# Patient Record
Sex: FEMALE | Race: WHITE | NOT HISPANIC OR LATINO | URBAN - METROPOLITAN AREA
[De-identification: names, ages, dates, MRNs, and addresses within clinical notes are randomized per-mention and may not be internally consistent; named-entity substitution may affect disease eponyms.]

---

## 2017-01-23 ENCOUNTER — OUTPATIENT (OUTPATIENT)
Dept: OUTPATIENT SERVICES | Facility: HOSPITAL | Age: 62
LOS: 1 days | Discharge: HOME | End: 2017-01-23

## 2017-06-27 DIAGNOSIS — I34.9 NONRHEUMATIC MITRAL VALVE DISORDER, UNSPECIFIED: ICD-10-CM

## 2024-04-10 ENCOUNTER — INPATIENT (INPATIENT)
Facility: HOSPITAL | Age: 69
LOS: 5 days | Discharge: HOME HEALTH | DRG: 291 | End: 2024-04-16
Attending: STUDENT IN AN ORGANIZED HEALTH CARE EDUCATION/TRAINING PROGRAM | Admitting: STUDENT IN AN ORGANIZED HEALTH CARE EDUCATION/TRAINING PROGRAM
Payer: MEDICARE

## 2024-04-10 VITALS
RESPIRATION RATE: 20 BRPM | OXYGEN SATURATION: 97 % | SYSTOLIC BLOOD PRESSURE: 149 MMHG | DIASTOLIC BLOOD PRESSURE: 97 MMHG | HEART RATE: 173 BPM

## 2024-04-10 DIAGNOSIS — I48.92 UNSPECIFIED ATRIAL FLUTTER: ICD-10-CM

## 2024-04-10 LAB
ALBUMIN SERPL ELPH-MCNC: 4.5 G/DL — SIGNIFICANT CHANGE UP (ref 3.5–5.2)
ALP SERPL-CCNC: 115 U/L — SIGNIFICANT CHANGE UP (ref 30–115)
ALT FLD-CCNC: 36 U/L — SIGNIFICANT CHANGE UP (ref 0–41)
ANION GAP SERPL CALC-SCNC: 13 MMOL/L — SIGNIFICANT CHANGE UP (ref 7–14)
AST SERPL-CCNC: 33 U/L — SIGNIFICANT CHANGE UP (ref 0–41)
BASOPHILS # BLD AUTO: 0.03 K/UL — SIGNIFICANT CHANGE UP (ref 0–0.2)
BASOPHILS NFR BLD AUTO: 0.3 % — SIGNIFICANT CHANGE UP (ref 0–1)
BILIRUB SERPL-MCNC: 1 MG/DL — SIGNIFICANT CHANGE UP (ref 0.2–1.2)
BUN SERPL-MCNC: 15 MG/DL — SIGNIFICANT CHANGE UP (ref 10–20)
CALCIUM SERPL-MCNC: 9.7 MG/DL — SIGNIFICANT CHANGE UP (ref 8.4–10.5)
CHLORIDE SERPL-SCNC: 100 MMOL/L — SIGNIFICANT CHANGE UP (ref 98–110)
CO2 SERPL-SCNC: 23 MMOL/L — SIGNIFICANT CHANGE UP (ref 17–32)
CREAT SERPL-MCNC: 0.8 MG/DL — SIGNIFICANT CHANGE UP (ref 0.7–1.5)
EGFR: 81 ML/MIN/1.73M2 — SIGNIFICANT CHANGE UP
EOSINOPHIL # BLD AUTO: 0.02 K/UL — SIGNIFICANT CHANGE UP (ref 0–0.7)
EOSINOPHIL NFR BLD AUTO: 0.2 % — SIGNIFICANT CHANGE UP (ref 0–8)
GLUCOSE SERPL-MCNC: 146 MG/DL — HIGH (ref 70–99)
HCT VFR BLD CALC: 40.5 % — SIGNIFICANT CHANGE UP (ref 37–47)
HGB BLD-MCNC: 13.6 G/DL — SIGNIFICANT CHANGE UP (ref 12–16)
IMM GRANULOCYTES NFR BLD AUTO: 0.3 % — SIGNIFICANT CHANGE UP (ref 0.1–0.3)
LYMPHOCYTES # BLD AUTO: 1.43 K/UL — SIGNIFICANT CHANGE UP (ref 1.2–3.4)
LYMPHOCYTES # BLD AUTO: 16.5 % — LOW (ref 20.5–51.1)
MAGNESIUM SERPL-MCNC: 2 MG/DL — SIGNIFICANT CHANGE UP (ref 1.8–2.4)
MCHC RBC-ENTMCNC: 29.4 PG — SIGNIFICANT CHANGE UP (ref 27–31)
MCHC RBC-ENTMCNC: 33.6 G/DL — SIGNIFICANT CHANGE UP (ref 32–37)
MCV RBC AUTO: 87.5 FL — SIGNIFICANT CHANGE UP (ref 81–99)
MONOCYTES # BLD AUTO: 0.67 K/UL — HIGH (ref 0.1–0.6)
MONOCYTES NFR BLD AUTO: 7.7 % — SIGNIFICANT CHANGE UP (ref 1.7–9.3)
NEUTROPHILS # BLD AUTO: 6.5 K/UL — SIGNIFICANT CHANGE UP (ref 1.4–6.5)
NEUTROPHILS NFR BLD AUTO: 75 % — SIGNIFICANT CHANGE UP (ref 42.2–75.2)
NRBC # BLD: 0 /100 WBCS — SIGNIFICANT CHANGE UP (ref 0–0)
NT-PROBNP SERPL-SCNC: 1742 PG/ML — HIGH (ref 0–300)
PLATELET # BLD AUTO: 391 K/UL — SIGNIFICANT CHANGE UP (ref 130–400)
PMV BLD: 9.9 FL — SIGNIFICANT CHANGE UP (ref 7.4–10.4)
POTASSIUM SERPL-MCNC: 4.5 MMOL/L — SIGNIFICANT CHANGE UP (ref 3.5–5)
POTASSIUM SERPL-SCNC: 4.5 MMOL/L — SIGNIFICANT CHANGE UP (ref 3.5–5)
PROT SERPL-MCNC: 6.7 G/DL — SIGNIFICANT CHANGE UP (ref 6–8)
RBC # BLD: 4.63 M/UL — SIGNIFICANT CHANGE UP (ref 4.2–5.4)
RBC # FLD: 13.7 % — SIGNIFICANT CHANGE UP (ref 11.5–14.5)
SODIUM SERPL-SCNC: 136 MMOL/L — SIGNIFICANT CHANGE UP (ref 135–146)
TROPONIN T, HIGH SENSITIVITY RESULT: 7 NG/L — SIGNIFICANT CHANGE UP (ref 6–13)
WBC # BLD: 8.68 K/UL — SIGNIFICANT CHANGE UP (ref 4.8–10.8)
WBC # FLD AUTO: 8.68 K/UL — SIGNIFICANT CHANGE UP (ref 4.8–10.8)

## 2024-04-10 PROCEDURE — 93005 ELECTROCARDIOGRAM TRACING: CPT

## 2024-04-10 PROCEDURE — 84436 ASSAY OF TOTAL THYROXINE: CPT

## 2024-04-10 PROCEDURE — 71045 X-RAY EXAM CHEST 1 VIEW: CPT

## 2024-04-10 PROCEDURE — 93017 CV STRESS TEST TRACING ONLY: CPT

## 2024-04-10 PROCEDURE — 93312 ECHO TRANSESOPHAGEAL: CPT

## 2024-04-10 PROCEDURE — A9500: CPT

## 2024-04-10 PROCEDURE — 93320 DOPPLER ECHO COMPLETE: CPT

## 2024-04-10 PROCEDURE — 0225U NFCT DS DNA&RNA 21 SARSCOV2: CPT

## 2024-04-10 PROCEDURE — 83735 ASSAY OF MAGNESIUM: CPT

## 2024-04-10 PROCEDURE — 80048 BASIC METABOLIC PNL TOTAL CA: CPT

## 2024-04-10 PROCEDURE — 85610 PROTHROMBIN TIME: CPT

## 2024-04-10 PROCEDURE — 80053 COMPREHEN METABOLIC PANEL: CPT

## 2024-04-10 PROCEDURE — 75574 CT ANGIO HRT W/3D IMAGE: CPT | Mod: MC

## 2024-04-10 PROCEDURE — 99291 CRITICAL CARE FIRST HOUR: CPT

## 2024-04-10 PROCEDURE — 71045 X-RAY EXAM CHEST 1 VIEW: CPT | Mod: 26

## 2024-04-10 PROCEDURE — 86803 HEPATITIS C AB TEST: CPT

## 2024-04-10 PROCEDURE — 84145 PROCALCITONIN (PCT): CPT

## 2024-04-10 PROCEDURE — 93308 TTE F-UP OR LMTD: CPT

## 2024-04-10 PROCEDURE — 78452 HT MUSCLE IMAGE SPECT MULT: CPT | Mod: MC

## 2024-04-10 PROCEDURE — 80061 LIPID PANEL: CPT

## 2024-04-10 PROCEDURE — 93325 DOPPLER ECHO COLOR FLOW MAPG: CPT

## 2024-04-10 PROCEDURE — 93010 ELECTROCARDIOGRAM REPORT: CPT

## 2024-04-10 PROCEDURE — 84100 ASSAY OF PHOSPHORUS: CPT

## 2024-04-10 PROCEDURE — 71275 CT ANGIOGRAPHY CHEST: CPT | Mod: 26,MC

## 2024-04-10 PROCEDURE — 86901 BLOOD TYPING SEROLOGIC RH(D): CPT

## 2024-04-10 PROCEDURE — 85025 COMPLETE CBC W/AUTO DIFF WBC: CPT

## 2024-04-10 PROCEDURE — 86900 BLOOD TYPING SEROLOGIC ABO: CPT

## 2024-04-10 PROCEDURE — 36415 COLL VENOUS BLD VENIPUNCTURE: CPT

## 2024-04-10 PROCEDURE — 84443 ASSAY THYROID STIM HORMONE: CPT

## 2024-04-10 PROCEDURE — 86850 RBC ANTIBODY SCREEN: CPT

## 2024-04-10 PROCEDURE — 85027 COMPLETE CBC AUTOMATED: CPT

## 2024-04-10 RX ORDER — DILTIAZEM HCL 120 MG
5 CAPSULE, EXT RELEASE 24 HR ORAL
Qty: 125 | Refills: 0 | Status: DISCONTINUED | OUTPATIENT
Start: 2024-04-10 | End: 2024-04-10

## 2024-04-10 RX ORDER — SODIUM CHLORIDE 9 MG/ML
1000 INJECTION, SOLUTION INTRAVENOUS ONCE
Refills: 0 | Status: DISCONTINUED | OUTPATIENT
Start: 2024-04-10 | End: 2024-04-10

## 2024-04-10 RX ORDER — DILTIAZEM HCL 120 MG
15 CAPSULE, EXT RELEASE 24 HR ORAL
Qty: 125 | Refills: 0 | Status: DISCONTINUED | OUTPATIENT
Start: 2024-04-10 | End: 2024-04-11

## 2024-04-10 RX ORDER — SODIUM CHLORIDE 9 MG/ML
500 INJECTION, SOLUTION INTRAVENOUS ONCE
Refills: 0 | Status: COMPLETED | OUTPATIENT
Start: 2024-04-10 | End: 2024-04-10

## 2024-04-10 RX ORDER — DILTIAZEM HCL 120 MG
10 CAPSULE, EXT RELEASE 24 HR ORAL ONCE
Refills: 0 | Status: COMPLETED | OUTPATIENT
Start: 2024-04-10 | End: 2024-04-10

## 2024-04-10 RX ADMIN — SODIUM CHLORIDE 500 MILLILITER(S): 9 INJECTION, SOLUTION INTRAVENOUS at 15:16

## 2024-04-10 RX ADMIN — Medication 10 MILLIGRAM(S): at 15:50

## 2024-04-10 RX ADMIN — Medication 15 MG/HR: at 22:33

## 2024-04-10 RX ADMIN — Medication 5 MG/HR: at 16:29

## 2024-04-10 NOTE — ED PROVIDER NOTE - CRITICAL CARE ATTENDING CONTRIBUTION TO CARE
Attending Statement: I have personally provided the amount of critical care time documented below excluding time spent on separate procedures.     Critical Care Time Spent (min) Must be 30 or more minutes to qualify: 35.     68-year-old female history of mitral valve repair in 2017 follows up with cardiologist Dr. Vivian Montalvo lashonda is presenting here not feeling well.  Patient has been having some shortness of breath with exertion walking up stairs.  Cough for the last few days.  And chest discomfort.  Patient states she has had 1 episode of A-fib in the past 1 week status post repair of her mitral valve but since then has not had any.  No fevers chills nausea vomiting abdominal pain leg pain or swelling recent travel long plane car as long car rides no recent surgeries no tobacco.  CONSTITUTIONAL: WA / WN / NAD  HEAD: NCAT  EYES: PERRL; EOMI;   ENT: Normal pharynx; mucous membranes pink/moist, no erythema.  NECK: Supple; no meningeal signs  CARD: IRR; nl S1/S2; no M/R/G.  RESP: Respiratory rate and effort are normal; breath sounds clear and equal bilaterally.  ABD: Soft, NT ND   MSK/EXT: No gross deformities; full range of motion.  SKIN: Warm and dry;   NEURO: AAOx3,   PSYCH: Memory Intact, Normal Affect

## 2024-04-10 NOTE — ED PROVIDER NOTE - PHYSICAL EXAMINATION
VITAL SIGNS: noted  CONSTITUTIONAL: Well-developed; well-nourished; in no acute distress  HEAD: Normocephalic; atraumatic  EYES: PERRL, EOM intact; conjunctiva and sclera clear  ENT: No nasal discharge;  MMM, oropharynx clear   NECK: Supple; non tender. No anterior cervical lymphadenopathy noted  CARD: S1, S2 normal; no murmurs, gallops, or rubs. tachycardic and regular rhythm  RESP: CTAB/L, no wheezes, rales or rhonchi  ABD: Normal bowel sounds; soft; non-distended; non-tender; no organomegaly.  EXT: Normal ROM. No calf tenderness or edema. Distal pulses intact  NEURO: Awake and alert, oriented. Grossly unremarkable. No focal deficits.  SKIN: Skin exam is warm and dry, no acute rash

## 2024-04-10 NOTE — ED PROVIDER NOTE - OBJECTIVE STATEMENT
Pt is a 67 yo female presented to the ED with PMHx of benign meningioma s/p resection in 2015 at [facility], Robotic assisted Mitral valve repair for rheumatic heart disease in 2017 at [facility] follows with cardiologist Dr. Katia gallego who presents to the ED with complaints of palpitations. Pt admits to SOB with exertion while walking up stairs. Pt also admits to non-productive cough and chest discomfort for the past few days. Pt admits to experiencing an episode of a-fib in the past week s/p repair of her mitral valve but has not had any episodes since. Denies any fevers, chills, abdominal pain n/v/d or edema. Denies any recent surgeries or long distance travel.

## 2024-04-11 LAB
ALBUMIN SERPL ELPH-MCNC: 4.1 G/DL — SIGNIFICANT CHANGE UP (ref 3.5–5.2)
ALP SERPL-CCNC: 110 U/L — SIGNIFICANT CHANGE UP (ref 30–115)
ALT FLD-CCNC: 29 U/L — SIGNIFICANT CHANGE UP (ref 0–41)
ANION GAP SERPL CALC-SCNC: 11 MMOL/L — SIGNIFICANT CHANGE UP (ref 7–14)
AST SERPL-CCNC: 20 U/L — SIGNIFICANT CHANGE UP (ref 0–41)
BASOPHILS # BLD AUTO: 0.02 K/UL — SIGNIFICANT CHANGE UP (ref 0–0.2)
BASOPHILS NFR BLD AUTO: 0.3 % — SIGNIFICANT CHANGE UP (ref 0–1)
BILIRUB SERPL-MCNC: 1 MG/DL — SIGNIFICANT CHANGE UP (ref 0.2–1.2)
BUN SERPL-MCNC: 12 MG/DL — SIGNIFICANT CHANGE UP (ref 10–20)
CALCIUM SERPL-MCNC: 9.2 MG/DL — SIGNIFICANT CHANGE UP (ref 8.4–10.5)
CHLORIDE SERPL-SCNC: 102 MMOL/L — SIGNIFICANT CHANGE UP (ref 98–110)
CHOLEST SERPL-MCNC: 131 MG/DL — SIGNIFICANT CHANGE UP
CO2 SERPL-SCNC: 27 MMOL/L — SIGNIFICANT CHANGE UP (ref 17–32)
CREAT SERPL-MCNC: 0.7 MG/DL — SIGNIFICANT CHANGE UP (ref 0.7–1.5)
EGFR: 95 ML/MIN/1.73M2 — SIGNIFICANT CHANGE UP
EOSINOPHIL # BLD AUTO: 0.05 K/UL — SIGNIFICANT CHANGE UP (ref 0–0.7)
EOSINOPHIL NFR BLD AUTO: 0.6 % — SIGNIFICANT CHANGE UP (ref 0–8)
GLUCOSE SERPL-MCNC: 106 MG/DL — HIGH (ref 70–99)
HCT VFR BLD CALC: 38.3 % — SIGNIFICANT CHANGE UP (ref 37–47)
HCV AB S/CO SERPL IA: 0.06 COI — SIGNIFICANT CHANGE UP
HCV AB SERPL-IMP: SIGNIFICANT CHANGE UP
HDLC SERPL-MCNC: 47 MG/DL — LOW
HGB BLD-MCNC: 12.7 G/DL — SIGNIFICANT CHANGE UP (ref 12–16)
IMM GRANULOCYTES NFR BLD AUTO: 0.4 % — HIGH (ref 0.1–0.3)
LIPID PNL WITH DIRECT LDL SERPL: 71 MG/DL — SIGNIFICANT CHANGE UP
LYMPHOCYTES # BLD AUTO: 1.24 K/UL — SIGNIFICANT CHANGE UP (ref 1.2–3.4)
LYMPHOCYTES # BLD AUTO: 15.6 % — LOW (ref 20.5–51.1)
MCHC RBC-ENTMCNC: 29 PG — SIGNIFICANT CHANGE UP (ref 27–31)
MCHC RBC-ENTMCNC: 33.2 G/DL — SIGNIFICANT CHANGE UP (ref 32–37)
MCV RBC AUTO: 87.4 FL — SIGNIFICANT CHANGE UP (ref 81–99)
MONOCYTES # BLD AUTO: 0.73 K/UL — HIGH (ref 0.1–0.6)
MONOCYTES NFR BLD AUTO: 9.2 % — SIGNIFICANT CHANGE UP (ref 1.7–9.3)
NEUTROPHILS # BLD AUTO: 5.9 K/UL — SIGNIFICANT CHANGE UP (ref 1.4–6.5)
NEUTROPHILS NFR BLD AUTO: 73.9 % — SIGNIFICANT CHANGE UP (ref 42.2–75.2)
NON HDL CHOLESTEROL: 84 MG/DL — SIGNIFICANT CHANGE UP
NRBC # BLD: 0 /100 WBCS — SIGNIFICANT CHANGE UP (ref 0–0)
PHOSPHATE SERPL-MCNC: 4.2 MG/DL — SIGNIFICANT CHANGE UP (ref 2.1–4.9)
PLATELET # BLD AUTO: 334 K/UL — SIGNIFICANT CHANGE UP (ref 130–400)
PMV BLD: 9.7 FL — SIGNIFICANT CHANGE UP (ref 7.4–10.4)
POTASSIUM SERPL-MCNC: 4.6 MMOL/L — SIGNIFICANT CHANGE UP (ref 3.5–5)
POTASSIUM SERPL-SCNC: 4.6 MMOL/L — SIGNIFICANT CHANGE UP (ref 3.5–5)
PROCALCITONIN SERPL-MCNC: 0.03 NG/ML — SIGNIFICANT CHANGE UP (ref 0.02–0.1)
PROT SERPL-MCNC: 6.3 G/DL — SIGNIFICANT CHANGE UP (ref 6–8)
RAPID RVP RESULT: SIGNIFICANT CHANGE UP
RBC # BLD: 4.38 M/UL — SIGNIFICANT CHANGE UP (ref 4.2–5.4)
RBC # FLD: 13.8 % — SIGNIFICANT CHANGE UP (ref 11.5–14.5)
SARS-COV-2 RNA SPEC QL NAA+PROBE: SIGNIFICANT CHANGE UP
SODIUM SERPL-SCNC: 140 MMOL/L — SIGNIFICANT CHANGE UP (ref 135–146)
T4 AB SER-ACNC: 6.6 UG/DL — SIGNIFICANT CHANGE UP (ref 4.6–12)
TRIGL SERPL-MCNC: 67 MG/DL — SIGNIFICANT CHANGE UP
TSH SERPL-MCNC: 4.45 UIU/ML — HIGH (ref 0.27–4.2)
WBC # BLD: 7.97 K/UL — SIGNIFICANT CHANGE UP (ref 4.8–10.8)
WBC # FLD AUTO: 7.97 K/UL — SIGNIFICANT CHANGE UP (ref 4.8–10.8)

## 2024-04-11 PROCEDURE — 93010 ELECTROCARDIOGRAM REPORT: CPT | Mod: 77

## 2024-04-11 PROCEDURE — 71045 X-RAY EXAM CHEST 1 VIEW: CPT | Mod: 26

## 2024-04-11 PROCEDURE — 99223 1ST HOSP IP/OBS HIGH 75: CPT

## 2024-04-11 PROCEDURE — 99222 1ST HOSP IP/OBS MODERATE 55: CPT

## 2024-04-11 PROCEDURE — 93010 ELECTROCARDIOGRAM REPORT: CPT

## 2024-04-11 RX ORDER — METOPROLOL TARTRATE 50 MG
25 TABLET ORAL ONCE
Refills: 0 | Status: COMPLETED | OUTPATIENT
Start: 2024-04-11 | End: 2024-04-11

## 2024-04-11 RX ORDER — FUROSEMIDE 40 MG
20 TABLET ORAL ONCE
Refills: 0 | Status: COMPLETED | OUTPATIENT
Start: 2024-04-11 | End: 2024-04-11

## 2024-04-11 RX ORDER — DILTIAZEM HCL 120 MG
7 CAPSULE, EXT RELEASE 24 HR ORAL
Qty: 125 | Refills: 0 | Status: DISCONTINUED | OUTPATIENT
Start: 2024-04-11 | End: 2024-04-12

## 2024-04-11 RX ORDER — ATORVASTATIN CALCIUM 80 MG/1
40 TABLET, FILM COATED ORAL AT BEDTIME
Refills: 0 | Status: DISCONTINUED | OUTPATIENT
Start: 2024-04-11 | End: 2024-04-16

## 2024-04-11 RX ORDER — LOSARTAN POTASSIUM 100 MG/1
100 TABLET, FILM COATED ORAL DAILY
Refills: 0 | Status: DISCONTINUED | OUTPATIENT
Start: 2024-04-11 | End: 2024-04-16

## 2024-04-11 RX ORDER — ENOXAPARIN SODIUM 100 MG/ML
70 INJECTION SUBCUTANEOUS EVERY 12 HOURS
Refills: 0 | Status: DISCONTINUED | OUTPATIENT
Start: 2024-04-11 | End: 2024-04-16

## 2024-04-11 RX ORDER — ATORVASTATIN CALCIUM 80 MG/1
1 TABLET, FILM COATED ORAL
Refills: 0 | DISCHARGE

## 2024-04-11 RX ORDER — FUROSEMIDE 40 MG
20 TABLET ORAL DAILY
Refills: 0 | Status: DISCONTINUED | OUTPATIENT
Start: 2024-04-11 | End: 2024-04-16

## 2024-04-11 RX ORDER — LOSARTAN/HYDROCHLOROTHIAZIDE 100MG-25MG
1 TABLET ORAL
Refills: 0 | DISCHARGE

## 2024-04-11 RX ORDER — METOPROLOL TARTRATE 50 MG
50 TABLET ORAL DAILY
Refills: 0 | Status: DISCONTINUED | OUTPATIENT
Start: 2024-04-11 | End: 2024-04-13

## 2024-04-11 RX ADMIN — ENOXAPARIN SODIUM 70 MILLIGRAM(S): 100 INJECTION SUBCUTANEOUS at 05:24

## 2024-04-11 RX ADMIN — Medication 25 MILLIGRAM(S): at 13:47

## 2024-04-11 RX ADMIN — Medication 5 MG/HR: at 05:20

## 2024-04-11 RX ADMIN — ENOXAPARIN SODIUM 70 MILLIGRAM(S): 100 INJECTION SUBCUTANEOUS at 18:22

## 2024-04-11 RX ADMIN — Medication 20 MILLIGRAM(S): at 02:46

## 2024-04-11 RX ADMIN — ATORVASTATIN CALCIUM 40 MILLIGRAM(S): 80 TABLET, FILM COATED ORAL at 21:07

## 2024-04-11 NOTE — PATIENT PROFILE ADULT - FALL HARM RISK - HARM RISK INTERVENTIONS

## 2024-04-11 NOTE — CONSULT NOTE ADULT - SUBJECTIVE AND OBJECTIVE BOX
Outpt cardiologist:    HISTORY OF PRESENT ILLNESS:  67 yo female presented to the ED with complaints of palpitations. Her PMH includes of a benign meningioma s/p resection in 2015 at [facility], Robotic assisted Mitral valve repair for rheumatic heart disease in 2017 at [facility] following which she had an immediate admission for Afib, post that she was started on Toprol 50mg and was briefly on Eliquis but later was stopped by her cardiologist citing risks vs benefits. She has 2 yearly follow ups with Dr. Quesada and per pt her last echo was normal. She was never diagnosed with CHF. Her palpitations were causing chest & abdominal discomfort and she also endorses dry cough for the past few days which prompted her to come the ED.     On presentation,   · BP Systolic	149 mm Hg  · BP Diastolic	  97 mm Hg  · Heart Rate	  173 /min  · Respiration Rate (breaths/min)	20 /min  · SpO2 (%)	97 %  · O2 Delivery/Oxygen Delivery Method	room air    Initial EKG was showing Aflutter with 2:1 variable block and tachycardia 148bpm.   CT PE - neg for PE, bilateral ground-glass opacities and small pleural effusions suggestive of a CHF exacerbation.  CXR - B/L patchy opacities, could be pulm edema in the setting of CHF exacerbation.   labs significant for a pro-BNP of 1742.     In the ED, she was started on dilzem drip for rate control.     Admitted to tele for further eval and management.  (2024 00:51)      FAMILY HISTORY:  FAMILY HISTORY:      SOCIAL HISTORY:  Social History:      ALLERGIES:  No Known Allergies      MEDICATIONS:  atorvastatin 40 milliGRAM(s) Oral at bedtime  diltiazem Infusion 5 mG/Hr (5 mL/Hr) IV Continuous <Continuous>  enoxaparin Injectable 70 milliGRAM(s) SubCutaneous every 12 hours  hydrochlorothiazide 12.5 milliGRAM(s) Oral daily  losartan 100 milliGRAM(s) Oral daily  metoprolol succinate ER 50 milliGRAM(s) Oral daily    PRN:      HOME MEDICATIONS:  Home Medications:  atorvastatin 40 mg oral tablet: 1 tab(s) orally once a day (at bedtime) (2024 01:06)  losartan-hydroCHLOROthiazide 100 mg-12.5 mg oral tablet: 1 tab(s) orally once a day (2024 01:06)  Toprol-XL 50 mg oral tablet, extended release: 1 tab(s) orally once a day (2024 01:07)      VITALS:   T(F): 97.3 ( @ 07:44), Max: 98 (04-10 @ 20:00)  HR: 63 ( @ 07:44) (63 - 173)  BP: 115/67 ( @ 07:44) (96/59 - 149/97)  BP(mean): 86 ( @ 07:44) (73 - 86)  RR: 18 ( @ 07:44) (18 - 20)  SpO2: 95% ( @ 07:44) (95% - 98%)    I&O's Summary      REVIEW OF SYSTEMS:  CONSTITUTIONAL: No weakness, fevers or chills  HEENT: No visual changes, neck/ear pain  RESPIRATORY: See HPI  CARDIOVASCULAR: See HPI  GASTROINTESTINAL: No abdominal pain. No nausea, vomiting, diarrhea   GENITOURINARY: No dysuria, frequency or hematuria  NEUROLOGICAL: No new focal deficits  SKIN: No new rashes    PHYSICAL EXAM:  *General: Not in distress.  Non-toxic appearing.   *HEENT: EOMI  *Cardio: regular, normal s1/s2, no murmur heard  *Pulm: bilateral clear  *Abdomen: Soft, non-tender  *Extremities: No edema b/l le. Warm. Knee caps warm. Pulses + bilaterally. Normal capillary refill.   *Neuro: A&O x3. No focal deficits    LABS:                        12.7   7.97  )-----------( 334      ( 2024 06:19 )             38.3     -    140  |  102  |  12  ----------------------------<  106<H>  4.6   |  27  |  0.7    Ca    9.2      2024 06:19  Phos  4.2     -11  Mg     2.0     -10    TPro  6.3  /  Alb  4.1  /  TBili  1.0  /  DBili  x   /  AST  20  /  ALT  29  /  AlkPhos  110  04-              Troponin trend:       Chol 131 LDL -- HDL 47<L> Trig 67      IMAGING:  - CXR:  - TTE:  - CT:   - CCTA:  - STRESS TEST:  - CATHETERIZATION:  - MRI:   - Ultrasound:     EC Lead ECG:   Ventricular Rate 74 BPM    Atrial Rate 296 BPM    QRS Duration 78 ms    Q-T Interval 454 ms    QTC Calculation(Bazett) 503 ms    P Axis 109 degrees    R Axis 58 degrees    T Axis 135 degrees    Diagnosis Line Atrial flutter with 4:1 A-V conduction  ST & T wave abnormality, consider anterolateral ischemia  Prolonged QT  Abnormal ECG    Confirmed by BRO SANCHEZ MD ([id number]) on 2024 6:54:34 AM ( @ 04:36)      TELEMETRY EVENTS:

## 2024-04-11 NOTE — CONSULT NOTE ADULT - ASSESSMENT
67 yo female presented to the ED with complaints of palpitations. Her PMH includes of a benign meningioma s/p resection in 2015 at [facility], Robotic assisted Mitral valve repair for rheumatic heart disease in 2017 at [facility] following which she had an immediate admission for Afib, post that she was started on Toprol 50mg and was briefly on Eliquis but later was stopped by her cardiologist citing risks vs benefits. She has 2 yearly follow ups with Dr. Quesada and per pt her last echo was normal. She was never diagnosed with CHF. Her palpitations were causing chest & abdominal discomfort and she also endorses dry cough for the past few days which prompted her to come the ED.     Initial EKG was showing Aflutter with 2:1 variable block and tachycardia 148bpm.   CT PE - neg for PE, bilateral ground-glass opacities and small pleural effusions suggestive of a CHF exacerbation.  CXR - B/L patchy opacities, could be pulm edema in the setting of CHF exacerbation.   labs significant for a pro-BNP of 1742.     In the ED, she was started on diltiazem drip for rate control.     EP consulted for Aflutter with RVR with 2:1 AV conduction. mentions having some chest pressure, sob and palpitations, no syncope, no dizziness,  patient mentions that she was on amiodarone for few months for maribel op Afib after which she stopped it     # Impression:  - Aflutter with 2:1 AV conduction, most likely typical   - hx of Afib post MV repair in 2017  - HTN    # Recs:  - keep on telemetry   - get TTE  - cw AC for now. will need eliquis 5 mg po BID on DC   - keep NPO after MN for ALINA / DCCV  - cw cardizem drip for now + Toprol 50 mg XL od  - get sleep studies as OP  - check TSH   - keep Mg > 2.2 and K > 4  - may benefit from Afib + Aflutter ablation as OP

## 2024-04-11 NOTE — PATIENT PROFILE ADULT - NSPROSPHOSPCHAPLAINYN_GEN_A_NUR
Chart reports that when pt was recently discharged from [facility] one month prior to current admission, patient reported throwing out all medication because it does not help him.
no

## 2024-04-11 NOTE — H&P ADULT - NSHPPHYSICALEXAM_GEN_ALL_CORE
CONSTITUTIONAL: no apparent distress  RESP: No respiratory distress, no use of accessory muscles; CTA b/l, no WRR  CV: +S1S2; pedal edema 1+  GI: Soft, NT, ND  MSK: Normal gait; normal muscle strength/tone  NEURO: CN II-XII intact; No FND  PSYCH: AAOx4 CONSTITUTIONAL: no apparent distress  RESP: No respiratory distress, no use of accessory muscles; B/L crackles   CV: +S1S2; pedal edema 1+  GI: Soft, NT, ND  MSK: Normal gait; normal muscle strength/tone  NEURO: CN II-XII intact; No FND  PSYCH: AAOx4

## 2024-04-11 NOTE — CONSULT NOTE ADULT - SUBJECTIVE AND OBJECTIVE BOX
HPI:  67 yo female presented to the ED with complaints of palpitations. Her PMH includes of a benign meningioma s/p resection in 2015 at [facility], Robotic assisted Mitral valve repair for rheumatic heart disease in 2017 at [facility] following which she had an immediate admission for Afib, post that she was started on Toprol 50mg and was briefly on Eliquis but later was stopped by her cardiologist citing risks vs benefits. She has 2 yearly follow ups with Dr. Quesada and per pt her last echo was normal. She was never diagnosed with CHF. Her palpitations were causing chest & abdominal discomfort and she also endorses dry cough for the past few days which prompted her to come the ED.     Initial EKG was showing Aflutter with 2:1 variable block and tachycardia 148bpm.   CT PE - neg for PE, bilateral ground-glass opacities and small pleural effusions suggestive of a CHF exacerbation.  CXR - B/L patchy opacities, could be pulm edema in the setting of CHF exacerbation.   labs significant for a pro-BNP of 1742.     In the ED, she was started on dilzem drip for rate control.     EP consulted for Aflutter with RVR with 2:1 AV conduction. mentions having some chest pressure, sob and palpitations, no syncope, no dizziness,  patient mentions that she was on amiodarone for few months for maribel op Afib after which she stopped it         PAST MEDICAL & SURGICAL HISTORY      FAMILY HISTORY:  FAMILY HISTORY:      SOCIAL HISTORY:  []smoker  []Alcohol  []Drug    ALLERGIES:  No Known Allergies      MEDICATIONS:  MEDICATIONS  (STANDING):  atorvastatin 40 milliGRAM(s) Oral at bedtime  diltiazem Infusion 5 mG/Hr (5 mL/Hr) IV Continuous <Continuous>  enoxaparin Injectable 70 milliGRAM(s) SubCutaneous every 12 hours  furosemide    Tablet 20 milliGRAM(s) Oral daily  hydrochlorothiazide 12.5 milliGRAM(s) Oral daily  losartan 100 milliGRAM(s) Oral daily  metoprolol succinate ER 50 milliGRAM(s) Oral daily    MEDICATIONS  (PRN):      HOME MEDICATIONS:  Home Medications:  atorvastatin 40 mg oral tablet: 1 tab(s) orally once a day (at bedtime) (11 Apr 2024 01:06)  losartan-hydroCHLOROthiazide 100 mg-12.5 mg oral tablet: 1 tab(s) orally once a day (11 Apr 2024 01:06)  Toprol-XL 50 mg oral tablet, extended release: 1 tab(s) orally once a day (11 Apr 2024 01:07)      VITALS:   T(F): 97.3 (04-11 @ 07:44), Max: 98 (04-10 @ 20:00)  HR: 130 (04-11 @ 13:09) (63 - 173)  BP: 162/79 (04-11 @ 13:09) (96/59 - 162/79)  BP(mean): 86 (04-11 @ 07:44) (73 - 86)  RR: 18 (04-11 @ 07:44) (18 - 20)  SpO2: 95% (04-11 @ 07:44) (95% - 98%)    I&O's Summary      REVIEW OF SYSTEMS:  CONSTITUTIONAL: No weakness, fevers or chills  EYES: No visual changes  ENT: No vertigo or throat pain   NECK: No pain or stiffness  RESPIRATORY: No cough, wheezing, hemoptysis; No shortness of breath  CARDIOVASCULAR: chest pressure   GASTROINTESTINAL: No abdominal or epigastric pain. No nausea, vomiting, or hematemesis; No diarrhea or constipation. No melena or hematochezia.  GENITOURINARY: No dysuria, frequency or hematuria  NEUROLOGICAL: No numbness or weakness  SKIN: No itching, no rashes  MSK: No pain    PHYSICAL EXAM:  NEURO: patient is awake , alert and oriented  GEN: Not in acute distress  NECK: no thyroid enlargement, no JVD  LUNGS: Clear to auscultation bilaterally   CARDIOVASCULAR: RRR, normal s1s2  ABD: Soft, non-tender, non-distended, +BS  EXT: No TUYET  SKIN: Intact    LABS:                        12.7   7.97  )-----------( 334      ( 11 Apr 2024 06:19 )             38.3     04-11    140  |  102  |  12  ----------------------------<  106<H>  4.6   |  27  |  0.7    Ca    9.2      11 Apr 2024 06:19  Phos  4.2     04-11  Mg     2.0     04-10    TPro  6.3  /  Alb  4.1  /  TBili  1.0  /  DBili  x   /  AST  20  /  ALT  29  /  AlkPhos  110  04-11              Troponin trend:      04-11 Chol 131 LDL -- HDL 47<L> Trig 67      RADIOLOGY:  -CXR:  -TTE:  -CCTA:  -STRESS TEST:  -CATHETERIZATION:    ECG:    TELEMETRY EVENTS:   HPI:  67 yo female presented to the ED with complaints of palpitations. Her PMH includes of a benign meningioma s/p resection in 2015 at [facility], Robotic assisted Mitral valve repair for rheumatic heart disease in 2017 at [facility] following which she had an immediate admission for Afib, post that she was started on Toprol 50mg and was briefly on Eliquis but later was stopped by her cardiologist citing risks vs benefits. She has 2 yearly follow ups with Dr. Quesada and per pt her last echo was normal. She was never diagnosed with CHF. Her palpitations were causing chest & abdominal discomfort and she also endorses dry cough for the past few days which prompted her to come the ED.     Initial EKG was showing Aflutter with 2:1 variable block and tachycardia 148bpm.   CT PE - neg for PE, bilateral ground-glass opacities and small pleural effusions suggestive of a CHF exacerbation.  CXR - B/L patchy opacities, could be pulm edema in the setting of CHF exacerbation.   labs significant for a pro-BNP of 1742.     In the ED, she was started on dilzem drip for rate control.     EP consulted for Aflutter with RVR with 2:1 AV conduction. mentions having some chest pressure, sob and palpitations, no syncope, no dizziness,  patient mentions that she was on amiodarone for few months for maribel op Afib after which she stopped it         PAST MEDICAL & SURGICAL HISTORY      FAMILY HISTORY:  FAMILY HISTORY:      SOCIAL HISTORY:  []smoker  []Alcohol  []Drug    ALLERGIES:  No Known Allergies      MEDICATIONS:  MEDICATIONS  (STANDING):  atorvastatin 40 milliGRAM(s) Oral at bedtime  diltiazem Infusion 5 mG/Hr (5 mL/Hr) IV Continuous <Continuous>  enoxaparin Injectable 70 milliGRAM(s) SubCutaneous every 12 hours  furosemide    Tablet 20 milliGRAM(s) Oral daily  hydrochlorothiazide 12.5 milliGRAM(s) Oral daily  losartan 100 milliGRAM(s) Oral daily  metoprolol succinate ER 50 milliGRAM(s) Oral daily    MEDICATIONS  (PRN):      HOME MEDICATIONS:  Home Medications:  atorvastatin 40 mg oral tablet: 1 tab(s) orally once a day (at bedtime) (11 Apr 2024 01:06)  losartan-hydroCHLOROthiazide 100 mg-12.5 mg oral tablet: 1 tab(s) orally once a day (11 Apr 2024 01:06)  Toprol-XL 50 mg oral tablet, extended release: 1 tab(s) orally once a day (11 Apr 2024 01:07)      VITALS:   T(F): 97.3 (04-11 @ 07:44), Max: 98 (04-10 @ 20:00)  HR: 130 (04-11 @ 13:09) (63 - 173)  BP: 162/79 (04-11 @ 13:09) (96/59 - 162/79)  BP(mean): 86 (04-11 @ 07:44) (73 - 86)  RR: 18 (04-11 @ 07:44) (18 - 20)  SpO2: 95% (04-11 @ 07:44) (95% - 98%)    I&O's Summary      REVIEW OF SYSTEMS:  CONSTITUTIONAL: No weakness, fevers or chills  EYES: No visual changes  ENT: No vertigo or throat pain   NECK: No pain or stiffness  RESPIRATORY: No cough, wheezing, hemoptysis; No shortness of breath  CARDIOVASCULAR: chest pressure   GASTROINTESTINAL: No abdominal or epigastric pain. No nausea, vomiting, or hematemesis; No diarrhea or constipation. No melena or hematochezia.  GENITOURINARY: No dysuria, frequency or hematuria  NEUROLOGICAL: No numbness or weakness  SKIN: No itching, no rashes  MSK: No pain    PHYSICAL EXAM:  NEURO: patient is awake , alert and oriented  GEN: Not in acute distress  NECK: no thyroid enlargement, no JVD  LUNGS: Clear to auscultation bilaterally   CARDIOVASCULAR: RRR, normal s1s2  ABD: Soft, non-tender, non-distended, +BS  EXT: No TUYET  SKIN: Intact    LABS:                        12.7   7.97  )-----------( 334      ( 11 Apr 2024 06:19 )             38.3     04-11    140  |  102  |  12  ----------------------------<  106<H>  4.6   |  27  |  0.7    Ca    9.2      11 Apr 2024 06:19  Phos  4.2     04-11  Mg     2.0     04-10    TPro  6.3  /  Alb  4.1  /  TBili  1.0  /  DBili  x   /  AST  20  /  ALT  29  /  AlkPhos  110  04-11              Troponin trend:      04-11 Chol 131 LDL -- HDL 47<L> Trig 67      RADIOLOGY:  -CXR:  -TTE:  -CCTA:  -STRESS TEST:  -CATHETERIZATION:    ECG: aflutter with 2:1 AV conduction     TELEMETRY EVENTS:

## 2024-04-11 NOTE — PATIENT PROFILE ADULT - HAS THE PATIENT RECEIVED THE INFLUENZA VACCINE THIS SEASON?
Physical Therapy Treatment    Patient Name:  Sammi Abreu   MRN:  [id number]    Recommendations:     Discharge Recommendations:  home health PT   Discharge Equipment Recommendations: none   Barriers to discharge: None    Assessment:     Sammi Abreu is a 78 y.o. female admitted with a medical diagnosis of LUIZ (acute kidney injury).  She presents with the following impairments/functional limitations:  weakness, impaired endurance, impaired self care skills, impaired functional mobilty, impaired cardiopulmonary response to activity Tolerated treatment well, but complains of mild pain in L anterior thigh following ambulation which has been a consistent issue for her. Able to ambulate greater distance today as compared to previous treatment sessions.     Rehab Prognosis: Good; patient would benefit from acute skilled PT services to address these deficits and reach maximum level of function.    Recent Surgery: Procedure(s) (LRB):  COLONOSCOPY (N/A) 1 Day Post-Op    Plan:     During this hospitalization, patient to be seen 6 x/week to address the identified rehab impairments via gait training, therapeutic activities, therapeutic exercises and progress toward the following goals:    · Plan of Care Expires:  09/05/20    Subjective     Chief Complaint: pain in L thigh  Patient/Family Comments/goals: to return home  Pain/Comfort:  · Pain Rating 1: 4/10(following treatment)  · Location - Side 1: Left  · Location - Orientation 1: anterior  · Location 1: thigh  · Pain Addressed 1: Reposition, Nurse notified      Objective:     Communicated with nurse Simeon prior to session.  Patient found supine with bed alarm, oxygen, peripheral IV, telemetry upon PT entry to room.     General Precautions: Standard, fall, respiratory   Orthopedic Precautions:N/A   Braces: N/A     Functional Mobility:  · Bed Mobility:     · Rolling Left:  contact guard assistance  · Supine to Sit: contact guard assistance  · Transfers:     · Sit to  Stand:  contact guard assistance with rolling walker  · Gait: 250' with rw/O2 and CGA       AM-PAC 6 CLICK MOBILITY          Therapeutic Activities and Exercises:  Transferred EOB and then on/off commode with CGA.   Sit to stand with rw/O2 and CGA then ambulated 250' in hallway and returned to room  Sat in chair at bedside.     Patient left up in chair with all lines intact, call button in reach, chair alarm on and nurse Cailin notified..    GOALS:   Multidisciplinary Problems     Physical Therapy Goals        Problem: Physical Therapy Goal    Goal Priority Disciplines Outcome Goal Variances Interventions   Physical Therapy Goal     PT, PT/OT Ongoing, Progressing     Description: Goals to be met by: 2020     Patient will increase functional independence with mobility by performin. Supine to sit with Supervision  2. Sit to stand transfer with Supervision  3. Bed to chair transfer with Supervision using Rolling Walker  4. Gait  x 150 feet with Supervision using Rolling Walker.   5. Lower extremity exercise program x20 reps per handout, with independence                     Time Tracking:     PT Received On: 20  PT Start Time: 908     PT Stop Time: 925  PT Total Time (min): 17 min     Billable Minutes: Gait Training 17    Treatment Type: Treatment  PT/PTA: PTA     PTA Visit Number: 1     Ernestine Zhang, HOLLY  2020   yes...

## 2024-04-11 NOTE — H&P ADULT - ATTENDING COMMENTS
***My note supersedes any discrepancies that may be above in the residents note***    69 yo female with a PMH of  benign meningioma s/p resection in 2015 at [facility], Robotic assisted Mitral valve repair for rheumatic heart disease in 2017 at [facility], Afib (resolved) c/o palpitations & dry cough for the past few days with EKG showing Aflutter with 2:1 variable block. Admitted for CHF exacerbation.      #New CHF  #Aflutter 2/2 CHF   #H/o Rheumatic heart disease s/p mitral valve repair in 2017  - CT PE - neg for PE, bilateral ground-glass opacities and small pleural effusions suggestive of a CHF exacerbation.  - CXR - B/L patchy opacities, could be pulm edema in the setting of CHF exacerbation.   - pro-BNP - 1742  - dilzem drip for rate control - titrate down as needed and transition to PO  - c/w toprol 50mg   - IV lasix 20mg stat given - continue as appropriate  - F/U ECHO   - Consult cardiology    - Consult EP  - CHADsVASc - 3  - therapeutic AC with lovenox   - strict Is &Os    #HTN   - c/w losartan - HCTZ    #HLD   - F/U am lipid panel   - c/w atorvastatin 40mg     #MISC   - Diet - Dash   - Gi prophy - not indicated   - DVT prophy - lovenox   - activity - AAT    #Progress Note Handoff  Pending (specify):  EP c/s, Cards c/s, TTE  Family discussion: updated  Disposition:  Unknown at this time________

## 2024-04-11 NOTE — CONSULT NOTE ADULT - ASSESSMENT
68 year old female Pmhx of Htn, HLD, h/o MR s/p Mitral valve repair 2017 at [facility], h/o Afib post MVR(Not on AC) follows with Dr. Quesada(seen >1 yr ago) here with LEE, constant chest pressure, palpitations for last 3 days. Admitted for Aflutter 2:1 conduction rapid HR(180s), possible CHF. Cardiology consulted for further management of Aflutter and possible CHF.       #Aflutter 2:1 conduction  #suspected CHF in setting of tachycardia/Aflutter  # h/o MR s/p Mitral valve repair 2017 at [facility]  #h/o Afib post MVR(Not on AC)  #HTN  #HLD    Probnp: 1742  Troponin: 7  EKG: Atrial flutter with 2:1 A-V conduction, Left posterior fascicular block, ST & T wave abnormality, consider lateral ischemia  CXR: Pulmonary vascular congestion and bibasilar opacities/atelectasis  CT angio chest: No filling defect to suggestpulmonary embolus. Bilateral groundglass opacities and small pleural effusions suggestive of   a CHF exacerbation.    Recommendations:  - Obtain TTE  - TSH, Free thyroxin  - EP eval  - continue with po metoprolol  - start po Cardizem and wean IV Cardizem as tolerated  - [id number] 3, continue with AC, can switch to po  - s/p lasix 20mg IV, appears close to euvolemia on exam with near resolution of symptoms, may start po lasix  - GDMT based on echo      INCOMPLETE NOTE PENDING ATTENDING ATTESTATION 68 year old female Pmhx of Htn, HLD, h/o MR s/p Mitral valve repair 2017 at [facility], h/o Afib post MVR(Not on AC) follows with Dr. Quesada(seen >1 yr ago) here with LEE, constant chest pressure, palpitations for last 3 days. Admitted for Aflutter 2:1 conduction rapid HR(180s), possible CHF. Cardiology consulted for further management of Aflutter and possible CHF.       #Aflutter 2:1 conduction with tachycardia(170-180)on admission  #Volume overload/CHF in setting of tachycardia/Aflutter  # h/o MR s/p Mitral valve repair 2017 at [facility]  #h/o Afib post MVR(Not on AC)  #HTN  #HLD    Probnp: 1742  Troponin: 7  EKG: Atrial flutter with 2:1 A-V conduction, Left posterior fascicular block, ST & T wave abnormality, consider lateral ischemia  CXR: Pulmonary vascular congestion and bibasilar opacities/atelectasis  CT angio chest: No filling defect to suggestpulmonary embolus. Bilateral groundglass opacities and small pleural effusions suggestive of   a CHF exacerbation.    Recommendations:  - Obtain TTE after cardioversion/when tachycardia resolves   - TSH, Free thyroxin  - EP eval noted, plan for ALINA/DCCV tomorrow  - continue with Cardizem IV + po metoprolol per EP  - Continue with AC, can switch to po  - s/p lasix 20mg IV, appears close to euvolemia on exam with near resolution of symptoms, may start po lasix and reassess need for diuretics after cardioversion  - GDMT based on echo  - OP sleep studies  - OP Follow up with Dr. Quesada

## 2024-04-11 NOTE — H&P ADULT - HISTORY OF PRESENT ILLNESS
69 yo female presented to the ED with complaints of palpitations. Her PMH includes of a benign meningioma s/p resection in 2015 at [facility], Robotic assisted Mitral valve repair for rheumatic heart disease in 2017 at [facility] following which she had an immediate admission for Afib, post that she was started on Toprol 50mg and was briefly on Eliquis but later was stopped by her cardiologist citing risks vs benefits. She has 2 yearly follow ups with Dr. Quesada and per pt her last echo was normal. She was never diagnosed with CHF. Her palpitations were causing chest & abdominal discomfort and she also endorses dry cough for the past few days which prompted her to come the ED.   On presentation,  · BP Systolic	149 mm Hg · BP Diastolic	  97 mm Hg · Heart Rate	  173 /min · Respiration Rate (breaths/min)	20 /min · SpO2 (%)	97 % · O2 Delivery/Oxygen Delivery Method	room air  Initial EKG was showing Aflutter with 2:1 variable block and tachycardia 148bpm.  CT PE - neg for PE, bilateral ground-glass opacities and small pleural effusions suggestive of a CHF exacerbation. CXR - B/L patchy opacities, could be pulm edema in the setting of CHF exacerbation.  labs significant for a pro-BNP of 1742.   In the ED, she was started on dilzem drip for rate control.   Admitted to tele for further eval and management.

## 2024-04-11 NOTE — H&P ADULT - ASSESSMENT
67 yo female with a PMH of  benign meningioma s/p resection in 2015 at [facility], Robotic assisted Mitral valve repair for rheumatic heart disease in 2017 at [facility], Afib (resolved) c/o palpitations & dry cough for the past few days with EKG showing Aflutter with 2:1 variable block. Admitted for CHF exacerbation.      #New CHF  #Aflutter 2/2 CHF   #H/o Rheumatic heart disease s/p mitral valve repair in 2017  - CT PE - neg for PE, bilateral ground-glass opacities and small pleural effusions suggestive of a CHF exacerbation.  - CXR - B/L patchy opacities, could be pulm edema in the setting of CHF exacerbation.   - pro-BNP - 1742  - dilzem drip for rate control - titrate down as needed   - c/w toprol 50mg   - IVC check   - IV diuretics   - F/U EKG & CXR in the am   - F/U ECHO   - F/U cardiology consult (pts cardiologist is Dr. Quesada)  - F/U RVP     #HTN   - c/w losartan - HCTZ    #HLD   - F/U am lipid panel   - c/w atorvastatin 40mg     #MISC   - Diet - Dash   - Gi prophy - not indicated   - DVT prophy - lovenox   - activity - AAT   67 yo female with a PMH of  benign meningioma s/p resection in 2015 at [facility], Robotic assisted Mitral valve repair for rheumatic heart disease in 2017 at [facility], Afib (resolved) c/o palpitations & dry cough for the past few days with EKG showing Aflutter with 2:1 variable block. Admitted for CHF exacerbation.      #New CHF  #Aflutter 2/2 CHF   #H/o Rheumatic heart disease s/p mitral valve repair in 2017  - CT PE - neg for PE, bilateral ground-glass opacities and small pleural effusions suggestive of a CHF exacerbation.  - CXR - B/L patchy opacities, could be pulm edema in the setting of CHF exacerbation.   - pro-BNP - 1742  - dilzem drip for rate control - titrate down as needed   - c/w toprol 50mg   - IVC check   - IV diuretics   - F/U EKG & CXR in the am   - F/U ECHO   - F/U cardiology consult (pts cardiologist is Dr. Quesada)  - F/U RVP   - CHADSVASc - 3    #HTN   - c/w losartan - HCTZ    #HLD   - F/U am lipid panel   - c/w atorvastatin 40mg     #MISC   - Diet - Dash   - Gi prophy - not indicated   - DVT prophy - lovenox   - activity - AAT   67 yo female with a PMH of  benign meningioma s/p resection in 2015 at [facility], Robotic assisted Mitral valve repair for rheumatic heart disease in 2017 at [facility], Afib (resolved) c/o palpitations & dry cough for the past few days with EKG showing Aflutter with 2:1 variable block. Admitted for CHF exacerbation.      #New CHF  #Aflutter 2/2 CHF   #H/o Rheumatic heart disease s/p mitral valve repair in 2017  - CT PE - neg for PE, bilateral ground-glass opacities and small pleural effusions suggestive of a CHF exacerbation.  - CXR - B/L patchy opacities, could be pulm edema in the setting of CHF exacerbation.   - pro-BNP - 1742  - dilzem drip for rate control - titrate down as needed   - c/w toprol 50mg   - IVC check - 2.2 cms not collapsing  - IV lasix 20mg stat given - continue as appropriate  - F/U EKG & CXR in the am   - F/U ECHO   - F/U cardiology consult (pts cardiologist is Dr. Quesada)  - F/U RVP   - CHADsVASc - 3  - strict Is &Os    #HTN   - c/w losartan - HCTZ    #HLD   - F/U am lipid panel   - c/w atorvastatin 40mg     #MISC   - Diet - Dash   - Gi prophy - not indicated   - DVT prophy - lovenox   - activity - AAT   67 yo female with a PMH of  benign meningioma s/p resection in 2015 at [facility], Robotic assisted Mitral valve repair for rheumatic heart disease in 2017 at [facility], Afib (resolved) c/o palpitations & dry cough for the past few days with EKG showing Aflutter with 2:1 variable block. Admitted for CHF exacerbation.      #New CHF  #Aflutter 2/2 CHF   #H/o Rheumatic heart disease s/p mitral valve repair in 2017  - CT PE - neg for PE, bilateral ground-glass opacities and small pleural effusions suggestive of a CHF exacerbation.  - CXR - B/L patchy opacities, could be pulm edema in the setting of CHF exacerbation.   - pro-BNP - 1742  - dilzem drip for rate control - titrate down as needed   - c/w toprol 50mg   - PE grade 2  - IVC check - 2.2 cms not collapsing  - IV lasix 20mg stat given - continue as appropriate  - F/U EKG & CXR in the am   - F/U ECHO   - F/U cardiology consult (pts cardiologist is Dr. Quesada)  - F/U RVP   - CHADsVASc - 3  - lovenox   - strict Is &Os    #HTN   - c/w losartan - HCTZ    #HLD   - F/U am lipid panel   - c/w atorvastatin 40mg     #MISC   - Diet - Dash   - Gi prophy - not indicated   - DVT prophy - lovenox   - activity - AAT   69 yo female with a PMH of  benign meningioma s/p resection in 2015 at [facility], Robotic assisted Mitral valve repair for rheumatic heart disease in 2017 at [facility], Afib (resolved) c/o palpitations & dry cough for the past few days with EKG showing Aflutter with 2:1 variable block. Admitted for CHF exacerbation.      #New CHF  #Aflutter 2/2 CHF   #H/o Rheumatic heart disease s/p mitral valve repair in 2017  - CT PE - neg for PE, bilateral ground-glass opacities and small pleural effusions suggestive of a CHF exacerbation.  - CXR - B/L patchy opacities, could be pulm edema in the setting of CHF exacerbation.   - pro-BNP - 1742  - dilzem drip for rate control - titrate down as needed   - c/w toprol 50mg   - PE grade 2  - IVC check - 2.2 cms not collapsing  - IV lasix 20mg stat given - continue as appropriate  - F/U EKG & CXR in the am   - F/U ECHO   - F/U cardiology consult (pts cardiologist is Dr. Quesada)  - F/U RVP   - CHADsVASc - 3  - therapeutic AC with lovenox   - strict Is &Os    #HTN   - c/w losartan - HCTZ    #HLD   - F/U am lipid panel   - c/w atorvastatin 40mg     #MISC   - Diet - Dash   - Gi prophy - not indicated   - DVT prophy - lovenox   - activity - AAT   67 yo female with a PMH of  benign meningioma s/p resection in 2015 at [facility], Robotic assisted Mitral valve repair for rheumatic heart disease in 2017 at [facility], Afib (resolved) c/o palpitations & dry cough for the past few days with EKG showing Aflutter with 2:1 variable block. Admitted for CHF exacerbation.      #New CHF  #Aflutter 2/2 CHF   #H/o Rheumatic heart disease s/p mitral valve repair in 2017  - CT PE - neg for PE, bilateral ground-glass opacities and small pleural effusions suggestive of a CHF exacerbation.  - CXR - B/L patchy opacities, could be pulm edema in the setting of CHF exacerbation.   - pro-BNP - 1742  - dilzem drip for rate control - titrate down as needed and transition to PO  - c/w toprol 50mg   - PE grade 2  - IVC check - 2.2 cms not collapsing  - IV lasix 20mg stat given - continue as appropriate  - F/U EKG & CXR in the am   - F/U ECHO   - Consult cardiology if ECHO abnormal or rate control not achieved (pts cardiologist is Dr. Quesada)  - F/U RVP   - CHADsVASc - 3  - therapeutic AC with lovenox   - strict Is &Os    #HTN   - c/w losartan - HCTZ    #HLD   - F/U am lipid panel   - c/w atorvastatin 40mg     #MISC   - Diet - Dash   - Gi prophy - not indicated   - DVT prophy - lovenox   - activity - AAT

## 2024-04-11 NOTE — CONSULT NOTE ADULT - ATTENDING COMMENTS
Cardio: Dr. Vivian Quesada    67 yo F with history of MV repair (2017 at [facility]) with post op AFib (within 2 weeks of procedure) briefly on Amio for a few months. She has not had any other episodes of AFib for the last several years and she has not been on Amio or Eliquis since. She presents with chest pressure and was noted to have AFlutter with RVR.     Rec  - Monitor on tele  - Check 2D echo  - Ischemic work up  - NPO after midnight for ALINA/CV  - Full anticoagulation. Eliquis 5mg PO BID.   - Increase rate control with cardizem drip as needed. Can use Esmolol for better rate control if Cardizem doesn't work.   - Outpatient YANICK eval   - may benefit from Afib + Aflutter ablation as OP
In short, 68-year-old female with a history of mitral valve repair in 2017 with post operative a fib at that time who presented to the hospital with 3 days of LEE and chest discomfort, found to be in atrial flutter with variable conduction. CT chest showed small bilateral pleural effusions, troponin was unremarkable and BNP 1700. She likely developed mild CHF from her atrial arrhythmia. Feels improved after IV diuretic. Would continue diltiazem IV and PO metoprolol with plans for ALINA/DCCV tomorrow. Would obtain routine TTE tomorrow when in sinus rhythm and transition to DOAC on discharge. Can continue PO diuretic however she may not need this long term. Will need close outpatient cardiology follow up.

## 2024-04-12 ENCOUNTER — TRANSCRIPTION ENCOUNTER (OUTPATIENT)
Age: 69
End: 2024-04-12

## 2024-04-12 ENCOUNTER — RESULT REVIEW (OUTPATIENT)
Age: 69
End: 2024-04-12

## 2024-04-12 LAB
ANION GAP SERPL CALC-SCNC: 13 MMOL/L — SIGNIFICANT CHANGE UP (ref 7–14)
BUN SERPL-MCNC: 10 MG/DL — SIGNIFICANT CHANGE UP (ref 10–20)
CALCIUM SERPL-MCNC: 9.1 MG/DL — SIGNIFICANT CHANGE UP (ref 8.4–10.5)
CHLORIDE SERPL-SCNC: 101 MMOL/L — SIGNIFICANT CHANGE UP (ref 98–110)
CO2 SERPL-SCNC: 25 MMOL/L — SIGNIFICANT CHANGE UP (ref 17–32)
CREAT SERPL-MCNC: 0.6 MG/DL — LOW (ref 0.7–1.5)
EGFR: 98 ML/MIN/1.73M2 — SIGNIFICANT CHANGE UP
GLUCOSE SERPL-MCNC: 104 MG/DL — HIGH (ref 70–99)
HCT VFR BLD CALC: 38.1 % — SIGNIFICANT CHANGE UP (ref 37–47)
HGB BLD-MCNC: 12.2 G/DL — SIGNIFICANT CHANGE UP (ref 12–16)
INR BLD: 1.13 RATIO — SIGNIFICANT CHANGE UP (ref 0.65–1.3)
MAGNESIUM SERPL-MCNC: 2 MG/DL — SIGNIFICANT CHANGE UP (ref 1.8–2.4)
MCHC RBC-ENTMCNC: 28.6 PG — SIGNIFICANT CHANGE UP (ref 27–31)
MCHC RBC-ENTMCNC: 32 G/DL — SIGNIFICANT CHANGE UP (ref 32–37)
MCV RBC AUTO: 89.2 FL — SIGNIFICANT CHANGE UP (ref 81–99)
NRBC # BLD: 0 /100 WBCS — SIGNIFICANT CHANGE UP (ref 0–0)
PLATELET # BLD AUTO: 309 K/UL — SIGNIFICANT CHANGE UP (ref 130–400)
PMV BLD: 9.8 FL — SIGNIFICANT CHANGE UP (ref 7.4–10.4)
POTASSIUM SERPL-MCNC: 4.2 MMOL/L — SIGNIFICANT CHANGE UP (ref 3.5–5)
POTASSIUM SERPL-SCNC: 4.2 MMOL/L — SIGNIFICANT CHANGE UP (ref 3.5–5)
PROTHROM AB SERPL-ACNC: 12.9 SEC — HIGH (ref 9.95–12.87)
RBC # BLD: 4.27 M/UL — SIGNIFICANT CHANGE UP (ref 4.2–5.4)
RBC # FLD: 13.8 % — SIGNIFICANT CHANGE UP (ref 11.5–14.5)
SODIUM SERPL-SCNC: 139 MMOL/L — SIGNIFICANT CHANGE UP (ref 135–146)
WBC # BLD: 8.67 K/UL — SIGNIFICANT CHANGE UP (ref 4.8–10.8)
WBC # FLD AUTO: 8.67 K/UL — SIGNIFICANT CHANGE UP (ref 4.8–10.8)

## 2024-04-12 PROCEDURE — 93010 ELECTROCARDIOGRAM REPORT: CPT

## 2024-04-12 PROCEDURE — 92960 CARDIOVERSION ELECTRIC EXT: CPT

## 2024-04-12 PROCEDURE — 99233 SBSQ HOSP IP/OBS HIGH 50: CPT

## 2024-04-12 PROCEDURE — 93320 DOPPLER ECHO COMPLETE: CPT | Mod: 26

## 2024-04-12 PROCEDURE — 93325 DOPPLER ECHO COLOR FLOW MAPG: CPT | Mod: 26

## 2024-04-12 PROCEDURE — 93312 ECHO TRANSESOPHAGEAL: CPT | Mod: 26,XU

## 2024-04-12 RX ORDER — DILTIAZEM HCL 120 MG
12 CAPSULE, EXT RELEASE 24 HR ORAL
Qty: 125 | Refills: 0 | Status: DISCONTINUED | OUTPATIENT
Start: 2024-04-12 | End: 2024-04-12

## 2024-04-12 RX ORDER — APIXABAN 2.5 MG/1
1 TABLET, FILM COATED ORAL
Qty: 60 | Refills: 0
Start: 2024-04-12 | End: 2024-05-11

## 2024-04-12 RX ORDER — FUROSEMIDE 40 MG
1 TABLET ORAL
Qty: 30 | Refills: 0
Start: 2024-04-12 | End: 2024-05-11

## 2024-04-12 RX ORDER — DILTIAZEM HCL 120 MG
10 CAPSULE, EXT RELEASE 24 HR ORAL
Qty: 125 | Refills: 0 | Status: DISCONTINUED | OUTPATIENT
Start: 2024-04-12 | End: 2024-04-12

## 2024-04-12 RX ADMIN — Medication 10 MG/HR: at 06:13

## 2024-04-12 RX ADMIN — LOSARTAN POTASSIUM 100 MILLIGRAM(S): 100 TABLET, FILM COATED ORAL at 05:49

## 2024-04-12 RX ADMIN — ENOXAPARIN SODIUM 70 MILLIGRAM(S): 100 INJECTION SUBCUTANEOUS at 05:47

## 2024-04-12 RX ADMIN — Medication 12 MG/HR: at 10:25

## 2024-04-12 RX ADMIN — Medication 7 MG/HR: at 05:49

## 2024-04-12 RX ADMIN — ENOXAPARIN SODIUM 70 MILLIGRAM(S): 100 INJECTION SUBCUTANEOUS at 18:12

## 2024-04-12 RX ADMIN — Medication 20 MILLIGRAM(S): at 05:48

## 2024-04-12 RX ADMIN — Medication 50 MILLIGRAM(S): at 05:48

## 2024-04-12 RX ADMIN — ATORVASTATIN CALCIUM 40 MILLIGRAM(S): 80 TABLET, FILM COATED ORAL at 21:06

## 2024-04-12 NOTE — DISCHARGE NOTE PROVIDER - ATTENDING DISCHARGE PHYSICAL EXAMINATION:
Patient was not seen by myself. She was rounded on by resident physician who verbally staffed patient with me. HDS, afebrile and sating well on RA as reflected on chart review. Per resident she was asymptomatic, euvolemic and had no complaints of dyspnea or chest pain. Patient wanted to leave and did before I could finish my rounds and see her. Her CCTA was complete but still pending at time of discharge. Cardiology recommendations noted if there was no obstructive disease then no further recommendations. If there were to be lesions found and she was asymptomatic(which she was) it is chronic and can have a LHC 30-days from now after she finishes her coarse of anti-coagulation. Patient understood this and has appropriate follow up scheduled with Cardiology(Dr. Quesada)

## 2024-04-12 NOTE — DISCHARGE NOTE PROVIDER - CARE PROVIDER_API CALL
Shawn Quesada  Interventional Cardiology  [address] [place] [address], [address]  [place] [address]  Phone: [phone]  Fax: [phone]  Follow Up Time:     Venessa Bran  Cardiovascular Disease  [facility] [place] [address]  Phone: [phone]  Fax: [phone]  Follow Up Time:    Venessa Bran  Cardiovascular Disease  [address] [place] [address]  Phone: [phone]  Fax: [phone]  Follow Up Time:     Vivian Quesada  Cardiovascular Disease  [address] [facility] [address]  [place] [address]  Phone: [phone]  Fax: [phone]  Follow Up Time:

## 2024-04-12 NOTE — PROGRESS NOTE ADULT - ASSESSMENT
69 yo female presented to the ED with complaints of palpitations. Her PMH includes of a benign meningioma s/p resection in 2015 at [facility], Robotic assisted Mitral valve repair for rheumatic heart disease in 2017 at [facility] following which she had an immediate admission for Afib.       Acute CHF  Atrial Flutter             PLAN:    ·	Tele reviewed  ·	EKG on admission: Atrial flutter 148/min. 2:1 conduction (Interpreted by me)  ·	CTA chest reviewed. No acute PE. B/L ground glass opacities and small pleural effusion  67 yo female presented to the ED with complaints of palpitations. Her PMH includes of a benign meningioma s/p resection in 2015 at [facility], Robotic assisted Mitral valve repair for rheumatic heart disease in 2017 at [facility] following which she had an immediate admission for Afib.       Acute CHF  Atrial Flutter             PLAN:    ·	Tele reviewed  ·	EKG on admission: Atrial flutter 148/min. 2:1 conduction (Interpreted by me)  ·	CTA chest reviewed. No acute PE. B/L ground glass opacities and small pleural effusion   ·	EP eval noted. Recommended to cont Cardizem drip and adjust the dose to control the HR. Cont Metoprolol Succinate 50 mg po daily  ·	EP also recommended for ischemia w/u  ·	ECHO  ·	Scheduled for DCCV today  ·	NPO for now  ·	Cont Lovenox for now  ·	CTA chest noted. No acute PE. G/L ground glass opacities. Was in CHF on admission. Euvolemic now  ·	Cont Lasix 20 mg po daily  ·	Check i's and o's and daily wt  ·	Low salt diet and water restriction to 1.5 L/D    Progress Note Handoff    Pending (specify):  Consults_________, Tests________, Test Results_______, Other__DCCV today_______  Family discussion:  Disposition: Home___/SNF___/Other________/Unknown at this time________    Frantz Damian MD  Spectra: [id number]

## 2024-04-12 NOTE — DISCHARGE NOTE PROVIDER - CARE PROVIDERS DIRECT ADDRESSES
[email or web address]

## 2024-04-12 NOTE — PROGRESS NOTE ADULT - SUBJECTIVE AND OBJECTIVE BOX
PEDRO SOTERO  68y Female    CHIEF COMPLAINT:    Patient is a 68y old  Female who presents with a chief complaint of palpitations (11 Apr 2024 13:34)      INTERVAL HPI/OVERNIGHT EVENTS:    Patient seen and examined.    ROS: All other systems are negative.    Vital Signs:    T(F): 98.2 (04-12-24 @ 04:00), Max: 98.3 (04-11-24 @ 21:11)  HR: 144 (04-12-24 @ 07:00) (50 - 150)  BP: 133/72 (04-12-24 @ 07:00) (115/67 - 162/79)  RR: 18 (04-12-24 @ 04:00) (18 - 18)  SpO2: 95% (04-12-24 @ 04:00) (94% - 96%)  I&O's Summary    11 Apr 2024 07:01  -  12 Apr 2024 07:00  --------------------------------------------------------  IN: 45 mL / OUT: 600 mL / NET: -555 mL      Daily     Daily   CAPILLARY BLOOD GLUCOSE          PHYSICAL EXAM:    GENERAL:  NAD  SKIN: No rashes or lesions  HENT: Atraumatic. Normocephalic. PERRL. Moist membranes.  NECK: Supple, No JVD. No lymphadenopathy.  PULMONARY: CTA B/L. No wheezing. No rales  CVS: Normal S1, S2. Rate and Rhythm are regular. No murmurs.  ABDOMEN/GI: Soft, Nontender, Nondistended; BS present  EXTREMITIES: Peripheral pulses intact. No edema B/L LE.  NEUROLOGIC:  No motor or sensory deficit.  PSYCH: Alert & oriented x 3    Consultant(s) Notes Reviewed:  [x ] YES  [ ] NO  Care Discussed with Consultants/Other Providers [ x] YES  [ ] NO    EKG reviewed  Telemetry reviewed    LABS:                        12.7   7.97  )-----------( 334      ( 11 Apr 2024 06:19 )             38.3     04-11    140  |  102  |  12  ----------------------------<  106<H>  4.6   |  27  |  0.7    Ca    9.2      11 Apr 2024 06:19  Phos  4.2     04-11  Mg     2.0     04-10    TPro  6.3  /  Alb  4.1  /  TBili  1.0  /  DBili  x   /  AST  20  /  ALT  29  /  AlkPhos  110  04-11              RADIOLOGY & ADDITIONAL TESTS:    < from: CT Angio Chest PE Protocol w/ IV Cont (04.10.24 @ 17:27) >    IMPRESSION:    Motion limits evaluation.    No filling defect to suggestpulmonary embolus.    Bilateral groundglass opacities and small pleural effusions suggestive of   a CHF exacerbation.    < end of copied text >    Imaging or report Personally Reviewed:  [x ] YES  [ ] NO    Medications:  Standing  atorvastatin 40 milliGRAM(s) Oral at bedtime  diltiazem Infusion 10 mG/Hr IV Continuous <Continuous>  enoxaparin Injectable 70 milliGRAM(s) SubCutaneous every 12 hours  furosemide    Tablet 20 milliGRAM(s) Oral daily  hydrochlorothiazide 12.5 milliGRAM(s) Oral daily  losartan 100 milliGRAM(s) Oral daily  metoprolol succinate ER 50 milliGRAM(s) Oral daily    PRN Meds      Case discussed with resident    Care discussed with pt/family           PEDRO SOTERO  68y Female    CHIEF COMPLAINT:    Patient is a 68y old  Female who presents with a chief complaint of palpitations (11 Apr 2024 13:34)      INTERVAL HPI/OVERNIGHT EVENTS:    Patient seen and examined. C/O on and off palpitations. Pt states that she also had cp and sob which now have improved.     ROS: All other systems are negative.    Vital Signs:    T(F): 98.2 (04-12-24 @ 04:00), Max: 98.3 (04-11-24 @ 21:11)  HR: 144 (04-12-24 @ 07:00) (50 - 150)  BP: 133/72 (04-12-24 @ 07:00) (115/67 - 162/79)  RR: 18 (04-12-24 @ 04:00) (18 - 18)  SpO2: 95% (04-12-24 @ 04:00) (94% - 96%)  I&O's Summary    11 Apr 2024 07:01  -  12 Apr 2024 07:00  --------------------------------------------------------  IN: 45 mL / OUT: 600 mL / NET: -555 mL      Daily     Daily   CAPILLARY BLOOD GLUCOSE          PHYSICAL EXAM:    GENERAL:  NAD  SKIN: No rashes or lesions  HENT: Atraumatic. Normocephalic. PERRL. Moist membranes.  NECK: Supple, No JVD. No lymphadenopathy.  PULMONARY: CTA B/L. No wheezing. No rales  CVS: Normal S1, S2. Rate and Rhythm are irregular. No murmurs.  ABDOMEN/GI: Soft, Nontender, Nondistended; BS present  EXTREMITIES: Peripheral pulses intact. No edema B/L LE.  NEUROLOGIC:  No motor or sensory deficit.  PSYCH: Alert & oriented x 3    Consultant(s) Notes Reviewed:  [x ] YES  [ ] NO  Care Discussed with Consultants/Other Providers [ x] YES  [ ] NO    EKG reviewed  Telemetry reviewed    LABS:                        12.7   7.97  )-----------( 334      ( 11 Apr 2024 06:19 )             38.3     04-11    140  |  102  |  12  ----------------------------<  106<H>  4.6   |  27  |  0.7    Ca    9.2      11 Apr 2024 06:19  Phos  4.2     04-11  Mg     2.0     04-10    TPro  6.3  /  Alb  4.1  /  TBili  1.0  /  DBili  x   /  AST  20  /  ALT  29  /  AlkPhos  110  04-11              RADIOLOGY & ADDITIONAL TESTS:    < from: CT Angio Chest PE Protocol w/ IV Cont (04.10.24 @ 17:27) >    IMPRESSION:    Motion limits evaluation.    No filling defect to suggestpulmonary embolus.    Bilateral groundglass opacities and small pleural effusions suggestive of   a CHF exacerbation.    < end of copied text >    Imaging or report Personally Reviewed:  [x ] YES  [ ] NO    Medications:  Standing  atorvastatin 40 milliGRAM(s) Oral at bedtime  diltiazem Infusion 10 mG/Hr IV Continuous <Continuous>  enoxaparin Injectable 70 milliGRAM(s) SubCutaneous every 12 hours  furosemide    Tablet 20 milliGRAM(s) Oral daily  hydrochlorothiazide 12.5 milliGRAM(s) Oral daily  losartan 100 milliGRAM(s) Oral daily  metoprolol succinate ER 50 milliGRAM(s) Oral daily    PRN Meds      Case discussed with resident    Care discussed with pt/family

## 2024-04-12 NOTE — DISCHARGE NOTE PROVIDER - NSDCFUSCHEDAPPT_GEN_ALL_CORE_FT
Yousif Olson  [facility] PreAdmits  Scheduled Appointment: 04/16/2024    Shawn Quesada Physician [facility]  CARDIOLOGY [address] [place] [address]  Scheduled Appointment: 05/02/2024     Yousif Olson  [facility] PreAdmits  Scheduled Appointment: 04/16/2024    Venessa Bran  [facility] Physician [facility]  ELECTROPH [address]  Scheduled Appointment: 05/01/2024    Shawn Quesada  [facility] Physician [facility]  CARDIOLOGY [address]  Scheduled Appointment: 05/02/2024     Shawn Quesada  [facility] Physician [facility]  CARDIOLOGY [address]  Scheduled Appointment: 05/02/2024    Venessa Bran  [facility] Physician [facility]  ELECTROPH [address]  Scheduled Appointment: 06/05/2024

## 2024-04-12 NOTE — DISCHARGE NOTE PROVIDER - PROVIDER TOKENS
PROVIDER:[TOKEN:[[id number]]],PROVIDER:[TOKEN:[[id number]]] PROVIDER:[TOKEN:[[id number]]],PROVIDER:[TOKEN:[[id number]]]

## 2024-04-12 NOTE — DISCHARGE NOTE PROVIDER - NSDCCPCAREPLAN_GEN_ALL_CORE_FT
PRINCIPAL DISCHARGE DIAGNOSIS  Diagnosis: Atrial flutter  Assessment and Plan of Treatment: you have an abnormal heart rhythm. we did a procedure to correct it. please continue taking the medications we send you on. follow up with cardiology in one week. also take the water pill as you have some fluid in the lungs likely from heart failure     PRINCIPAL DISCHARGE DIAGNOSIS  Diagnosis: Atrial flutter  Assessment and Plan of Treatment: you have an abnormal heart rhythm. we did a procedure to correct it. please continue taking the medications we send you on. follow up with cardiology and electrophysiology in one week. also take the water pill as instructed. we found that you may have some lack of blood flow to your heart muscle. follow up with cardiology regarding the results of the test you performed today.

## 2024-04-12 NOTE — CHART NOTE - NSCHARTNOTEFT_GEN_A_CORE
POST OPERATIVE PROCEDURAL DOCUMENTATION  PRE-OP DIAGNOSIS: Atrial flutter.    POST-OP DIAGNOSIS: JEANINE occluded with no evidence of leak. Thrombosis noted within the body of the JEANINE. Successful cardioversion to NSR. Mild MR    PROCEDURE: Transesophageal echocardiogram    Primary Physician: Dr. Cheema  Fellow: Dr. Masterson    ANESTHESIA TYPE  [  ] General Anesthesia  [ x ] Conscious Sedation  [  ] Local/Regional    CONDITION  [  ] Critical  [  ] Serious  [  ] Fair  [ x ] Good    SPECIMENS REMOVED (IF APPLICABLE): N/A    IMPLANTS (IF APPLICABLE): None    ESTIMATED BLOOD LOSS: None    COMPLICATIONS: None      FINDINGS:    After risks and benefits of procedures were explained, informed consent was obtained and placed in chart. Refer to Anesthesia note for sedation details.  The ALINA probe was passed into the esophagus without difficulty.  Transesophageal and transgastric images were obtained.  The ALINA probe was removed without difficulty and examined.  There was no evidence for bleeding.  The patient tolerated the procedure well without any immediate ALINA-related complications.      Preliminary Findings:  LA: moderately enlarged  JEANINE: JEANINE occluded with no evidence of leak. Thrombosis noted within the body of the JEANINE.   LV: LVEF was estimated at 45-50%  MV: Mild MR, no evidence of MS.   AV: Trace AI, no evidence of AS.   RA: moderately-severely enlarged  TV: Mild TR.   PV: Trace PI.   IAS: no PFO. No R-> L shunt.   There was mild, non-mobile atheroma seen in the thoracic aorta.     Patient successfully converted to sinus rhythm with synchronized  200J of direct current cardioversion.    DIAGNOSIS/IMPRESSION:  JEANINE occluded with no evidence of leak. Thrombosis noted within the body of the JEANINE.   Successful cardioversion to NSR.   Mild MR    PLAN OF CARE:  Return to inpt bed  D/C cardizem. Continue with Metoprolol succinated 50mg QD  Switch anticoagulation to eliquis 5mg bid  Outpt follow up with Dr. Quesada and Dr. Bran POST OPERATIVE PROCEDURAL DOCUMENTATION  PRE-OP DIAGNOSIS: Atrial flutter.    POST-OP DIAGNOSIS: JEANINE occluded with no evidence of leak. Thrombosis noted within the body of the JEANINE. Successful cardioversion to NSR. Mild MR    PROCEDURE: Transesophageal echocardiogram    Primary Physician: Dr. Cheema  Fellow: Dr. Masterson    ANESTHESIA TYPE  [  ] General Anesthesia  [ x ] Conscious Sedation  [  ] Local/Regional    CONDITION  [  ] Critical  [  ] Serious  [  ] Fair  [ x ] Good    SPECIMENS REMOVED (IF APPLICABLE): N/A    IMPLANTS (IF APPLICABLE): None    ESTIMATED BLOOD LOSS: None    COMPLICATIONS: None      FINDINGS:    After risks and benefits of procedures were explained, informed consent was obtained and placed in chart. Refer to Anesthesia note for sedation details.  The ALINA probe was passed into the esophagus without difficulty.  Transesophageal and transgastric images were obtained.  The ALINA probe was removed without difficulty and examined.  There was no evidence for bleeding.  The patient tolerated the procedure well without any immediate ALINA-related complications.      Preliminary Findings:  LA: moderately enlarged  JEANINE: JEANINE occluded with no evidence of leak. Thrombosis noted within the body of the JEANINE.   LV: LVEF was estimated at 45-50%  MV: Mild MR, no evidence of MS.   AV: Trace AI, no evidence of AS.   RA: moderately-severely enlarged  TV: Mild TR.   PV: Trace PI.   IAS: no PFO. No R-> L shunt.   There was mild, non-mobile atheroma seen in the thoracic aorta.     Patient successfully converted to sinus rhythm with synchronized  200J of direct current cardioversion.    DIAGNOSIS/IMPRESSION:  JEANINE is surgically occluded with no evidence of leak. Thrombosis noted within the body of the JEANINE.   Successful cardioversion to NSR.   Mild MR    PLAN OF CARE:  Return to inpt bed  D/C cardizem. Continue with Metoprolol succinated 50mg QD  Switch anticoagulation to eliquis 5mg bid  Outpt follow up with Dr. Quesada and Dr. Bran

## 2024-04-12 NOTE — DISCHARGE NOTE PROVIDER - HOSPITAL COURSE
69 yo female with a PMH of  benign meningioma s/p resection in 2015 at [facility], Robotic assisted Mitral valve repair for rheumatic heart disease in 2017 at [facility], Afib (resolved) c/o palpitations & dry cough for the past few days with EKG showing Aflutter with 2:1 variable block. Admitted for CHF exacerbation.      #New CHF  #Aflutter 2/2 CHF   #H/o Rheumatic heart disease s/p mitral valve repair in 2017  - CT PE - neg for PE, bilateral ground-glass opacities and small pleural effusions suggestive of a CHF exacerbation.  - CXR - B/L patchy opacities, could be pulm edema in the setting of CHF exacerbation.   - pro-BNP - 1742  - dilzem drip for rate control - titrate down as needed and transition to PO  - c/w toprol 50mg   - PE grade 2  - IVC check - 2.2 cms not collapsing  - IV lasix 20mg stat given - continue as appropriate  - F/U EKG & CXR in the am   - F/U ECHO   - Consult cardiology if ECHO abnormal or rate control not achieved (pts cardiologist is Dr. Quesada)  - F/U RVP   - CHADsVASc - 3  - therapeutic AC with lovenox   - strict Is &Os    #HTN   - c/w losartan - HCTZ    #HLD   - F/U am lipid panel   - c/w atorvastatin 40mg     #MISC   - Diet - Dash   - Gi prophy - not indicated   - DVT prophy - lovenox   - activity - AAT   67 yo female with a PMH of  benign meningioma s/p resection in 2015 at [facility], Robotic assisted Mitral valve repair for rheumatic heart disease in 2017 at [facility], Afib (resolved) c/o palpitations & dry cough for the past few days with EKG showing Aflutter with 2:1 variable block. Admitted for CHF exacerbation.      #New CHF  #Aflutter 2/2 CHF   #H/o Rheumatic heart disease s/p mitral valve repair in 2017  - CT PE - neg for PE, bilateral ground-glass opacities and small pleural effusions suggestive of a CHF exacerbation.  - CXR - B/L patchy opacities, could be pulm edema in the setting of CHF exacerbation.   - pro-BNP - 1742  - dilzem drip for rate control - titrate down as needed and transition to PO  - c/w toprol 50mg   - IV lasix 20mg stat given - continue lasix going home   - Dr Quesada pt cardiologist  - F/U RVP negative  - CHADsVASc - 3  - therapeutic AC with lovenox - switch to eliquis on DC 5 BID  - echo pending    #HTN   - c/w losartan - HCTZ    #HLD   - F/U am lipid panel wnl  - c/w atorvastatin 40mg     Pt stable for DC   69 yo female presented to the ED with complaints of palpitations. Her PMH includes of a benign meningioma s/p resection in 2015 at [facility], Robotic assisted Mitral valve repair for rheumatic heart disease in 2017 at [facility] following which she had an immediate admission for Afib.       Acute HFmrEF  Paroxysmal Atrial Flutter  Chest Pain    Cont Metoprolol to 50 mg po q 8h. On d/c switch her to Metoprolol Succinate 150 mg po daily  EKG on admission: Atrial flutter 148/min. 2:1 conduction   CTA chest reviewed. No acute PE. B/L ground glass opacities and small pleural effusion, euvolemic on day of DC  S/P DCCV on 4/12. In NSR now. EP recommended ischemia w/u  Nuclear stress test showed mild reversible defect in the apical ant/anterolateral wall of the LV consistent with ischemia  CCTA performed today, f/u outpt for results with cardio  ECHO showed EF is 55-60%. No regional wall motion abnormalities.   s/p lovenox. On d/c switch her to Eliquis 5 mg po q 12h  Cont Lasix 20 mg po daily  Low salt diet and water restriction to 1.5 L/D    Pt stable for DC

## 2024-04-12 NOTE — DISCHARGE NOTE PROVIDER - NSDCMRMEDTOKEN_GEN_ALL_CORE_FT
atorvastatin 40 mg oral tablet: 1 tab(s) orally once a day (at bedtime)  losartan-hydroCHLOROthiazide 100 mg-12.5 mg oral tablet: 1 tab(s) orally once a day  Toprol-XL 50 mg oral tablet, extended release: 1 tab(s) orally once a day   atorvastatin 40 mg oral tablet: 1 tab(s) orally once a day (at bedtime)  Eliquis 5 mg oral tablet: 1 tab(s) orally 2 times a day  furosemide 20 mg oral tablet: 1 tab(s) orally once a day  losartan-hydroCHLOROthiazide 100 mg-12.5 mg oral tablet: 1 tab(s) orally once a day  Toprol-XL 50 mg oral tablet, extended release: 1 tab(s) orally once a day   atorvastatin 40 mg oral tablet: 1 tab(s) orally once a day (at bedtime)  Eliquis 5 mg oral tablet: 1 tab(s) orally 2 times a day  furosemide 20 mg oral tablet: 1 tab(s) orally once a day  losartan-hydroCHLOROthiazide 100 mg-12.5 mg oral tablet: 1 tab(s) orally once a day  Toprol-XL 50 mg oral tablet, extended release: 3 tab(s) orally once a day   atorvastatin 40 mg oral tablet: 1 tab(s) orally once a day (at bedtime)  Eliquis 5 mg oral tablet: 1 tab(s) orally 2 times a day  furosemide 20 mg oral tablet: 1 tab(s) orally once a day as needed for weight gain weigh yourself every morning- if you gain 2 pounds from yesterday take this medicine that day. or if you feel slightly short of breath, take this medicine that day  losartan-hydroCHLOROthiazide 100 mg-12.5 mg oral tablet: 1 tab(s) orally once a day  Toprol-XL 50 mg oral tablet, extended release: 3 tab(s) orally once a day

## 2024-04-13 PROCEDURE — 99233 SBSQ HOSP IP/OBS HIGH 50: CPT

## 2024-04-13 RX ORDER — METOPROLOL TARTRATE 50 MG
50 TABLET ORAL
Refills: 0 | Status: DISCONTINUED | OUTPATIENT
Start: 2024-04-13 | End: 2024-04-14

## 2024-04-13 RX ADMIN — Medication 20 MILLIGRAM(S): at 05:25

## 2024-04-13 RX ADMIN — ENOXAPARIN SODIUM 70 MILLIGRAM(S): 100 INJECTION SUBCUTANEOUS at 05:25

## 2024-04-13 RX ADMIN — LOSARTAN POTASSIUM 100 MILLIGRAM(S): 100 TABLET, FILM COATED ORAL at 05:25

## 2024-04-13 RX ADMIN — Medication 50 MILLIGRAM(S): at 05:25

## 2024-04-13 RX ADMIN — ATORVASTATIN CALCIUM 40 MILLIGRAM(S): 80 TABLET, FILM COATED ORAL at 22:13

## 2024-04-13 RX ADMIN — ENOXAPARIN SODIUM 70 MILLIGRAM(S): 100 INJECTION SUBCUTANEOUS at 17:43

## 2024-04-13 RX ADMIN — Medication 50 MILLIGRAM(S): at 17:43

## 2024-04-13 NOTE — PROGRESS NOTE ADULT - SUBJECTIVE AND OBJECTIVE BOX
PEDRO SOTERO  68y Female    CHIEF COMPLAINT:    Patient is a 68y old  Female who presents with a chief complaint of palpitations (2024 08:50)      INTERVAL HPI/OVERNIGHT EVENTS:    Patient seen and examined. S/P DCCV on . Denies palpitations. No cp. No sob    ROS: All other systems are negative.    Vital Signs:    T(F): 97.8 (24 @ 04:48), Max: 98.2 (24 @ 20:20)  HR: 101 (24 @ 04:48) (101 - 110)  BP: 141/77 (24 @ 04:48) (141/77 - 147/65)  RR: 18 (24 @ 04:48) (18 - 18)  SpO2: --  I&O's Summary    2024 07:01  -  2024 07:00  --------------------------------------------------------  IN: 540 mL / OUT: 2250 mL / NET: -1710 mL      Daily     Daily Weight in k.1 (2024 04:48)  CAPILLARY BLOOD GLUCOSE          PHYSICAL EXAM:    GENERAL:  NAD  SKIN: No rashes or lesions  HENT: Atraumatic. Normocephalic. PERRL. Moist membranes.  NECK: Supple, No JVD. No lymphadenopathy.  PULMONARY: CTA B/L. No wheezing. No rales  CVS: Normal S1, S2. Rate and Rhythm are regular. No murmurs.  ABDOMEN/GI: Soft, Nontender, Nondistended; BS present  EXTREMITIES: Peripheral pulses intact. No edema B/L LE.  NEUROLOGIC:  No motor or sensory deficit.  PSYCH: Alert & oriented x 3    Consultant(s) Notes Reviewed:  [x ] YES  [ ] NO  Care Discussed with Consultants/Other Providers [ x] YES  [ ] NO    EKG reviewed  Telemetry reviewed    LABS:                        12.2   8.67  )-----------( 309      ( 2024 06:48 )             38.1     04-12    139  |  101  |  10  ----------------------------<  104<H>  4.2   |  25  |  0.6<L>    Ca    9.1      2024 06:48  Mg     2.0     04-12      PT/INR - ( 2024 06:48 )   PT: 12.90 sec;   INR: 1.13 ratio                   RADIOLOGY & ADDITIONAL TESTS:    < from: Transesophageal Echocardiogram (24 @ 11:23) >    Summary:   1.Mildly decreased global left ventricular systolic function. Left   ventricular ejection fraction, by visual estimation, is 45 to 50%.   2. Moderate to severe right atrial enlargement.   3. Moderately enlarged left atrium.   4. Mild mitral valve regurgitation.   5. Mild-moderate tricuspid regurgitation.   6. Mild aortic regurgitation.   7. JEANINE is surgically occluded with no evidence of leak. Thrombus noted   within the body of the JEANINE.   8. Successful synchronized cardioversion from atrial flutterto NSR via   200J of direct current.    < end of copied text >    Imaging or report Personally Reviewed:  [x ] YES  [ ] NO    Medications:  Standing  atorvastatin 40 milliGRAM(s) Oral at bedtime  enoxaparin Injectable 70 milliGRAM(s) SubCutaneous every 12 hours  furosemide    Tablet 20 milliGRAM(s) Oral daily  hydrochlorothiazide 12.5 milliGRAM(s) Oral daily  losartan 100 milliGRAM(s) Oral daily  metoprolol tartrate 50 milliGRAM(s) Oral two times a day    PRN Meds      Case discussed with resident    Care discussed with pt/family

## 2024-04-13 NOTE — PROGRESS NOTE ADULT - ASSESSMENT
67 yo female presented to the ED with complaints of palpitations. Her PMH includes of a benign meningioma s/p resection in 2015 at [facility], Robotic assisted Mitral valve repair for rheumatic heart disease in 2017 at [facility] following which she had an immediate admission for Afib.       Acute HFmrEF  Paroxysmal Atrial Flutter  CP             PLAN:    ·	Tele reviewed. In NSR now  ·	EKG on admission: Atrial flutter 148/min. 2:1 conduction (Interpreted by me)  ·	CTA chest reviewed. No acute PE. B/L ground glass opacities and small pleural effusion   ·	S/P DCCV on 4/12. In NSR now. EP recommended ischemia w/u  ·	ALINA showed EF is 45-50%  ·	Cont Lovenox for now  ·	ECHO  ·	CTA chest noted. No acute PE. G/L ground glass opacities. Was in CHF on admission. Euvolemic now  ·	Cont Lasix 20 mg po daily  ·	Check i's and o's and daily wt  ·	Low salt diet and water restriction to 1.5 L/D    Progress Note Handoff    Pending (specify):  Consults_Cardiology f/u________, Tests_ECHO_______, Test Results_______, Other________  Family discussion:  Disposition: Home___/SNF___/Other________/Unknown at this time________    Frantz Damian MD  Spectra: [phone]

## 2024-04-14 ENCOUNTER — RESULT REVIEW (OUTPATIENT)
Age: 69
End: 2024-04-14

## 2024-04-14 ENCOUNTER — TRANSCRIPTION ENCOUNTER (OUTPATIENT)
Age: 69
End: 2024-04-14

## 2024-04-14 LAB
ALBUMIN SERPL ELPH-MCNC: 3.9 G/DL — SIGNIFICANT CHANGE UP (ref 3.5–5.2)
ALP SERPL-CCNC: 105 U/L — SIGNIFICANT CHANGE UP (ref 30–115)
ALT FLD-CCNC: 24 U/L — SIGNIFICANT CHANGE UP (ref 0–41)
ANION GAP SERPL CALC-SCNC: 13 MMOL/L — SIGNIFICANT CHANGE UP (ref 7–14)
ANION GAP SERPL CALC-SCNC: 14 MMOL/L — SIGNIFICANT CHANGE UP (ref 7–14)
AST SERPL-CCNC: 20 U/L — SIGNIFICANT CHANGE UP (ref 0–41)
BASOPHILS # BLD AUTO: 0.02 K/UL — SIGNIFICANT CHANGE UP (ref 0–0.2)
BASOPHILS NFR BLD AUTO: 0.3 % — SIGNIFICANT CHANGE UP (ref 0–1)
BILIRUB SERPL-MCNC: 1.6 MG/DL — HIGH (ref 0.2–1.2)
BUN SERPL-MCNC: 11 MG/DL — SIGNIFICANT CHANGE UP (ref 10–20)
BUN SERPL-MCNC: 9 MG/DL — LOW (ref 10–20)
CALCIUM SERPL-MCNC: 8.9 MG/DL — SIGNIFICANT CHANGE UP (ref 8.4–10.5)
CALCIUM SERPL-MCNC: 9.4 MG/DL — SIGNIFICANT CHANGE UP (ref 8.4–10.4)
CHLORIDE SERPL-SCNC: 103 MMOL/L — SIGNIFICANT CHANGE UP (ref 98–110)
CHLORIDE SERPL-SCNC: 99 MMOL/L — SIGNIFICANT CHANGE UP (ref 98–110)
CO2 SERPL-SCNC: 21 MMOL/L — SIGNIFICANT CHANGE UP (ref 17–32)
CO2 SERPL-SCNC: 25 MMOL/L — SIGNIFICANT CHANGE UP (ref 17–32)
CREAT SERPL-MCNC: 0.7 MG/DL — SIGNIFICANT CHANGE UP (ref 0.7–1.5)
CREAT SERPL-MCNC: 0.7 MG/DL — SIGNIFICANT CHANGE UP (ref 0.7–1.5)
EGFR: 95 ML/MIN/1.73M2 — SIGNIFICANT CHANGE UP
EGFR: 95 ML/MIN/1.73M2 — SIGNIFICANT CHANGE UP
EOSINOPHIL # BLD AUTO: 0.07 K/UL — SIGNIFICANT CHANGE UP (ref 0–0.7)
EOSINOPHIL NFR BLD AUTO: 0.9 % — SIGNIFICANT CHANGE UP (ref 0–8)
GLUCOSE SERPL-MCNC: 154 MG/DL — HIGH (ref 70–99)
GLUCOSE SERPL-MCNC: 93 MG/DL — SIGNIFICANT CHANGE UP (ref 70–99)
HCT VFR BLD CALC: 38.7 % — SIGNIFICANT CHANGE UP (ref 37–47)
HGB BLD-MCNC: 12.6 G/DL — SIGNIFICANT CHANGE UP (ref 12–16)
IMM GRANULOCYTES NFR BLD AUTO: 0.4 % — HIGH (ref 0.1–0.3)
LYMPHOCYTES # BLD AUTO: 1.14 K/UL — LOW (ref 1.2–3.4)
LYMPHOCYTES # BLD AUTO: 14.4 % — LOW (ref 20.5–51.1)
MAGNESIUM SERPL-MCNC: 2.2 MG/DL — SIGNIFICANT CHANGE UP (ref 1.8–2.4)
MAGNESIUM SERPL-MCNC: 2.2 MG/DL — SIGNIFICANT CHANGE UP (ref 1.8–2.4)
MCHC RBC-ENTMCNC: 29 PG — SIGNIFICANT CHANGE UP (ref 27–31)
MCHC RBC-ENTMCNC: 32.6 G/DL — SIGNIFICANT CHANGE UP (ref 32–37)
MCV RBC AUTO: 89 FL — SIGNIFICANT CHANGE UP (ref 81–99)
MONOCYTES # BLD AUTO: 0.84 K/UL — HIGH (ref 0.1–0.6)
MONOCYTES NFR BLD AUTO: 10.6 % — HIGH (ref 1.7–9.3)
NEUTROPHILS # BLD AUTO: 5.81 K/UL — SIGNIFICANT CHANGE UP (ref 1.4–6.5)
NEUTROPHILS NFR BLD AUTO: 73.4 % — SIGNIFICANT CHANGE UP (ref 42.2–75.2)
NRBC # BLD: 0 /100 WBCS — SIGNIFICANT CHANGE UP (ref 0–0)
PLATELET # BLD AUTO: 312 K/UL — SIGNIFICANT CHANGE UP (ref 130–400)
PMV BLD: 9.9 FL — SIGNIFICANT CHANGE UP (ref 7.4–10.4)
POTASSIUM SERPL-MCNC: 3.9 MMOL/L — SIGNIFICANT CHANGE UP (ref 3.5–5)
POTASSIUM SERPL-MCNC: 4.9 MMOL/L — SIGNIFICANT CHANGE UP (ref 3.5–5)
POTASSIUM SERPL-SCNC: 3.9 MMOL/L — SIGNIFICANT CHANGE UP (ref 3.5–5)
POTASSIUM SERPL-SCNC: 4.9 MMOL/L — SIGNIFICANT CHANGE UP (ref 3.5–5)
PROT SERPL-MCNC: 5.9 G/DL — LOW (ref 6–8)
RBC # BLD: 4.35 M/UL — SIGNIFICANT CHANGE UP (ref 4.2–5.4)
RBC # FLD: 13.6 % — SIGNIFICANT CHANGE UP (ref 11.5–14.5)
SODIUM SERPL-SCNC: 137 MMOL/L — SIGNIFICANT CHANGE UP (ref 135–146)
SODIUM SERPL-SCNC: 138 MMOL/L — SIGNIFICANT CHANGE UP (ref 135–146)
WBC # BLD: 7.91 K/UL — SIGNIFICANT CHANGE UP (ref 4.8–10.8)
WBC # FLD AUTO: 7.91 K/UL — SIGNIFICANT CHANGE UP (ref 4.8–10.8)

## 2024-04-14 PROCEDURE — 78452 HT MUSCLE IMAGE SPECT MULT: CPT | Mod: 26

## 2024-04-14 PROCEDURE — 99233 SBSQ HOSP IP/OBS HIGH 50: CPT

## 2024-04-14 PROCEDURE — 93016 CV STRESS TEST SUPVJ ONLY: CPT

## 2024-04-14 PROCEDURE — 93018 CV STRESS TEST I&R ONLY: CPT

## 2024-04-14 PROCEDURE — 93306 TTE W/DOPPLER COMPLETE: CPT | Mod: 26

## 2024-04-14 RX ORDER — APIXABAN 2.5 MG/1
1 TABLET, FILM COATED ORAL
Qty: 60 | Refills: 3
Start: 2024-04-14 | End: 2024-08-11

## 2024-04-14 RX ORDER — METOPROLOL TARTRATE 50 MG
3 TABLET ORAL
Qty: 0 | Refills: 0 | DISCHARGE

## 2024-04-14 RX ORDER — METOPROLOL TARTRATE 50 MG
3 TABLET ORAL
Qty: 90 | Refills: 3
Start: 2024-04-14 | End: 2024-08-11

## 2024-04-14 RX ORDER — REGADENOSON 0.08 MG/ML
0.4 INJECTION, SOLUTION INTRAVENOUS ONCE
Refills: 0 | Status: DISCONTINUED | OUTPATIENT
Start: 2024-04-14 | End: 2024-04-16

## 2024-04-14 RX ORDER — METOPROLOL TARTRATE 50 MG
50 TABLET ORAL THREE TIMES A DAY
Refills: 0 | Status: DISCONTINUED | OUTPATIENT
Start: 2024-04-14 | End: 2024-04-16

## 2024-04-14 RX ORDER — APIXABAN 2.5 MG/1
1 TABLET, FILM COATED ORAL
Qty: 60 | Refills: 2
Start: 2024-04-14 | End: 2024-07-12

## 2024-04-14 RX ADMIN — ATORVASTATIN CALCIUM 40 MILLIGRAM(S): 80 TABLET, FILM COATED ORAL at 22:22

## 2024-04-14 RX ADMIN — Medication 50 MILLIGRAM(S): at 14:26

## 2024-04-14 RX ADMIN — ENOXAPARIN SODIUM 70 MILLIGRAM(S): 100 INJECTION SUBCUTANEOUS at 06:47

## 2024-04-14 RX ADMIN — Medication 20 MILLIGRAM(S): at 06:47

## 2024-04-14 RX ADMIN — Medication 50 MILLIGRAM(S): at 06:47

## 2024-04-14 RX ADMIN — ENOXAPARIN SODIUM 70 MILLIGRAM(S): 100 INJECTION SUBCUTANEOUS at 17:58

## 2024-04-14 RX ADMIN — Medication 50 MILLIGRAM(S): at 22:22

## 2024-04-14 RX ADMIN — LOSARTAN POTASSIUM 100 MILLIGRAM(S): 100 TABLET, FILM COATED ORAL at 06:47

## 2024-04-14 NOTE — DIETITIAN INITIAL EVALUATION ADULT - ORAL INTAKE PTA/DIET HISTORY
Pt unable to participate in nutrition assessment interview at time of visit; sleeping. Will attempt to obtain nutrition hx at follow up as able. Hx limited to medical chart at this time.

## 2024-04-14 NOTE — DIETITIAN INITIAL EVALUATION ADULT - ADD RECOMMEND
1. Continue with current diet order until medically feasible to advance. Once able to advance, recommend DASH/TLC diet modifier; if appetite declines, supplement with Ensure Plus HP (350 kcal, 20g pro per carton)  2. Obtain current height    Pt is at high nutrition risk; will f/u in 3-5 days or prn.

## 2024-04-14 NOTE — DIETITIAN INITIAL EVALUATION ADULT - OTHER CALCULATIONS
Using ABW: ENERGY: 9996-3328 kcal/day (20-25 kcal/kg); PROTEIN: 73-87 g/day (1-1.2 g/kg); FLUID: 1815 mL/day (25 mL/kg) -- with consideration for age, BMI, CHF

## 2024-04-14 NOTE — CHART NOTE - NSCHARTNOTEFT_GEN_A_CORE
Was potential DC, with normal TTE, however positive stress test as below:    1. MILD REVERSIBLE DEFECT IN THE APICAL ANTERIOR/ANTEROLATERAL WALL OF   THE LEFT VENTRICLE CONSISTENT WITH ISCHEMIA.  2. NORMAL RESTING LEFT VENTRICULAR WALL MOTION AND WALL THICKENING.  3. LEFT VENTRICULAR EJECTION FRACTION OF  72 % WHICH IS WITHIN RANGE OF   NORMAL.    D/W attending. Will hold DC. I reached out to cardio for follow up.

## 2024-04-14 NOTE — DIETITIAN INITIAL EVALUATION ADULT - OTHER INFO
Pertinent Medical Information: Per H&P, pt is a 67 y/o female with PMHx of benign meningioma s/p resection in 2015 at [facility], Robotic assisted Mitral valve repair for rheumatic heart disease in 2017 at [facility], Afib (resolved) c/o palpitations & dry cough for the past few days with EKG showing Aflutter with 2:1 variable block. Admitted for CHF exacerbation.    Pertinent Subjective Information: Pt had fair appetite per staff; consuming 51-75% of meals provided in-house prior to today. Pt now NPO per flow sheet.    Weight hx: UBW unknown. Current dosing weight is 72.6 KG. No previous admission weights in EMR. Unable to determine if pt meets weight criteria for malnutrition at this time. Will attempt to obtain UBW at follow up as able.

## 2024-04-14 NOTE — PROGRESS NOTE ADULT - ASSESSMENT
67 yo female presented to the ED with complaints of palpitations. Her PMH includes of a benign meningioma s/p resection in 2015 at [facility], Robotic assisted Mitral valve repair for rheumatic heart disease in 2017 at [facility] following which she had an immediate admission for Afib.       Acute HFmrEF  Paroxysmal Atrial Flutter  CP             PLAN:    ·	Tele reviewed. In NSR now. Episodes of NSVT and tachycardia  ·	Increase Metoprolol to 50 mg po q 8h  ·	EKG on admission: Atrial flutter 148/min. 2:1 conduction (Interpreted by me)  ·	CTA chest reviewed. No acute PE. B/L ground glass opacities and small pleural effusion   ·	S/P DCCV on 4/12. In NSR now. EP recommended ischemia w/u  ·	Ordered Lexiscan  ·	AILNA showed EF is 45-50%  ·	Cont Lovenox for now  ·	ECHO  ·	CTA chest noted. No acute PE. Ground glass opacities. Was in CHF on admission. Euvolemic now  ·	Cont Lasix 20 mg po daily  ·	Check i's and o's and daily wt  ·	Low salt diet and water restriction to 1.5 L/D    Progress Note Handoff    Pending (specify):  Consults_Cardiology f/u________, Tests_ECHO_______, Test Results_______, Other________  Family discussion:  Disposition: Home___/SNF___/Other________/Unknown at this time________    Frantz Damian MD  Spectra: [phone]

## 2024-04-14 NOTE — PROGRESS NOTE ADULT - SUBJECTIVE AND OBJECTIVE BOX
SOTERO VASQUEZ  68y Female    CHIEF COMPLAINT:    Patient is a 68y old  Female who presents with a chief complaint of palpitations (13 Apr 2024 10:48)      INTERVAL HPI/OVERNIGHT EVENTS:    Patient seen and examined. Denies any palpitations. No cp. No sob    ROS: All other systems are negative.    Vital Signs:    T(F): 98.4 (04-14-24 @ 04:05), Max: 98.4 (04-14-24 @ 04:05)  HR: 85 (04-14-24 @ 04:05) (85 - 97)  BP: 137/72 (04-14-24 @ 04:05) (137/72 - 162/72)  RR: 18 (04-14-24 @ 04:05) (18 - 18)  SpO2: 96% (04-13-24 @ 20:17) (96% - 96%)  I&O's Summary    13 Apr 2024 07:01  -  14 Apr 2024 07:00  --------------------------------------------------------  IN: 0 mL / OUT: 700 mL / NET: -700 mL      Daily     Daily   CAPILLARY BLOOD GLUCOSE          PHYSICAL EXAM:    GENERAL:  NAD  SKIN: No rashes or lesions  HENT: Atraumatic. Normocephalic. PERRL. Moist membranes.  NECK: Supple, No JVD. No lymphadenopathy.  PULMONARY: CTA B/L. No wheezing. No rales  CVS: Normal S1, S2. Rate and Rhythm are regular. No murmurs.  ABDOMEN/GI: Soft, Nontender, Nondistended; BS present  EXTREMITIES: Peripheral pulses intact. No edema B/L LE.  NEUROLOGIC:  No motor or sensory deficit.  PSYCH: Alert & oriented x 3    Consultant(s) Notes Reviewed:  [x ] YES  [ ] NO  Care Discussed with Consultants/Other Providers [ x] YES  [ ] NO    EKG reviewed  Telemetry reviewed    LABS:                    RADIOLOGY & ADDITIONAL TESTS:      Imaging or report Personally Reviewed:  [ ] YES  [ ] NO    Medications:  Standing  atorvastatin 40 milliGRAM(s) Oral at bedtime  enoxaparin Injectable 70 milliGRAM(s) SubCutaneous every 12 hours  furosemide    Tablet 20 milliGRAM(s) Oral daily  hydrochlorothiazide 12.5 milliGRAM(s) Oral daily  losartan 100 milliGRAM(s) Oral daily  metoprolol tartrate 50 milliGRAM(s) Oral two times a day    PRN Meds      Case discussed with resident    Care discussed with pt/family

## 2024-04-14 NOTE — DIETITIAN INITIAL EVALUATION ADULT - PERTINENT LABORATORY DATA
04-14    138  |  103  |  9<L>  ----------------------------<  93  4.9   |  21  |  0.7    Ca    8.9      14 Apr 2024 06:55  Mg     2.2     04-14    TPro  5.9<L>  /  Alb  3.9  /  TBili  1.6<H>  /  DBili  x   /  AST  20  /  ALT  24  /  AlkPhos  105  04-14

## 2024-04-14 NOTE — DISCHARGE NOTE NURSING/CASE MANAGEMENT/SOCIAL WORK - PATIENT PORTAL LINK FT
You can access the FollowMyHealth Patient Portal offered by [facility] by registering at the following website: [email or web address]. By joining [employer]’s FollowMyHealth portal, you will also be able to view your health information using other applications (apps) compatible with our system.

## 2024-04-14 NOTE — DIETITIAN INITIAL EVALUATION ADULT - PERTINENT MEDS FT
MEDICATIONS  (STANDING):  atorvastatin 40 milliGRAM(s) Oral at bedtime  enoxaparin Injectable 70 milliGRAM(s) SubCutaneous every 12 hours  furosemide    Tablet 20 milliGRAM(s) Oral daily  hydrochlorothiazide 12.5 milliGRAM(s) Oral daily  losartan 100 milliGRAM(s) Oral daily  metoprolol tartrate 50 milliGRAM(s) Oral three times a day  regadenoson Injectable 0.4 milliGRAM(s) IV Push once    MEDICATIONS  (PRN):

## 2024-04-14 NOTE — DIETITIAN INITIAL EVALUATION ADULT - NAME AND PHONE
Fela [phone] or TEAMS    Nutrition Interventions: meals, snacks, supplements; Nutrition Monition: Diet order, PO intake, weights, labs, NFPF, body composition, BM and tolerance to medical food supplements

## 2024-04-15 LAB
ALBUMIN SERPL ELPH-MCNC: 4.1 G/DL — SIGNIFICANT CHANGE UP (ref 3.5–5.2)
ALP SERPL-CCNC: 113 U/L — SIGNIFICANT CHANGE UP (ref 30–115)
ALT FLD-CCNC: 27 U/L — SIGNIFICANT CHANGE UP (ref 0–41)
ANION GAP SERPL CALC-SCNC: 13 MMOL/L — SIGNIFICANT CHANGE UP (ref 7–14)
AST SERPL-CCNC: 22 U/L — SIGNIFICANT CHANGE UP (ref 0–41)
BASOPHILS # BLD AUTO: 0.03 K/UL — SIGNIFICANT CHANGE UP (ref 0–0.2)
BASOPHILS NFR BLD AUTO: 0.4 % — SIGNIFICANT CHANGE UP (ref 0–1)
BILIRUB SERPL-MCNC: 1.3 MG/DL — HIGH (ref 0.2–1.2)
BUN SERPL-MCNC: 11 MG/DL — SIGNIFICANT CHANGE UP (ref 10–20)
CALCIUM SERPL-MCNC: 9.3 MG/DL — SIGNIFICANT CHANGE UP (ref 8.4–10.5)
CHLORIDE SERPL-SCNC: 102 MMOL/L — SIGNIFICANT CHANGE UP (ref 98–110)
CO2 SERPL-SCNC: 24 MMOL/L — SIGNIFICANT CHANGE UP (ref 17–32)
CREAT SERPL-MCNC: 0.7 MG/DL — SIGNIFICANT CHANGE UP (ref 0.7–1.5)
EGFR: 95 ML/MIN/1.73M2 — SIGNIFICANT CHANGE UP
EOSINOPHIL # BLD AUTO: 0.11 K/UL — SIGNIFICANT CHANGE UP (ref 0–0.7)
EOSINOPHIL NFR BLD AUTO: 1.4 % — SIGNIFICANT CHANGE UP (ref 0–8)
GLUCOSE SERPL-MCNC: 100 MG/DL — HIGH (ref 70–99)
HCT VFR BLD CALC: 41 % — SIGNIFICANT CHANGE UP (ref 37–47)
HGB BLD-MCNC: 13.2 G/DL — SIGNIFICANT CHANGE UP (ref 12–16)
IMM GRANULOCYTES NFR BLD AUTO: 0.5 % — HIGH (ref 0.1–0.3)
LYMPHOCYTES # BLD AUTO: 1.51 K/UL — SIGNIFICANT CHANGE UP (ref 1.2–3.4)
LYMPHOCYTES # BLD AUTO: 18.8 % — LOW (ref 20.5–51.1)
MAGNESIUM SERPL-MCNC: 2.2 MG/DL — SIGNIFICANT CHANGE UP (ref 1.8–2.4)
MCHC RBC-ENTMCNC: 28.5 PG — SIGNIFICANT CHANGE UP (ref 27–31)
MCHC RBC-ENTMCNC: 32.2 G/DL — SIGNIFICANT CHANGE UP (ref 32–37)
MCV RBC AUTO: 88.6 FL — SIGNIFICANT CHANGE UP (ref 81–99)
MONOCYTES # BLD AUTO: 0.91 K/UL — HIGH (ref 0.1–0.6)
MONOCYTES NFR BLD AUTO: 11.3 % — HIGH (ref 1.7–9.3)
NEUTROPHILS # BLD AUTO: 5.42 K/UL — SIGNIFICANT CHANGE UP (ref 1.4–6.5)
NEUTROPHILS NFR BLD AUTO: 67.6 % — SIGNIFICANT CHANGE UP (ref 42.2–75.2)
NRBC # BLD: 0 /100 WBCS — SIGNIFICANT CHANGE UP (ref 0–0)
PLATELET # BLD AUTO: 350 K/UL — SIGNIFICANT CHANGE UP (ref 130–400)
PMV BLD: 9.9 FL — SIGNIFICANT CHANGE UP (ref 7.4–10.4)
POTASSIUM SERPL-MCNC: 4.6 MMOL/L — SIGNIFICANT CHANGE UP (ref 3.5–5)
POTASSIUM SERPL-SCNC: 4.6 MMOL/L — SIGNIFICANT CHANGE UP (ref 3.5–5)
PROT SERPL-MCNC: 6.4 G/DL — SIGNIFICANT CHANGE UP (ref 6–8)
RBC # BLD: 4.63 M/UL — SIGNIFICANT CHANGE UP (ref 4.2–5.4)
RBC # FLD: 13.5 % — SIGNIFICANT CHANGE UP (ref 11.5–14.5)
SODIUM SERPL-SCNC: 139 MMOL/L — SIGNIFICANT CHANGE UP (ref 135–146)
WBC # BLD: 8.02 K/UL — SIGNIFICANT CHANGE UP (ref 4.8–10.8)
WBC # FLD AUTO: 8.02 K/UL — SIGNIFICANT CHANGE UP (ref 4.8–10.8)

## 2024-04-15 PROCEDURE — 99232 SBSQ HOSP IP/OBS MODERATE 35: CPT

## 2024-04-15 RX ORDER — METOPROLOL TARTRATE 50 MG
150 TABLET ORAL ONCE
Refills: 0 | Status: COMPLETED | OUTPATIENT
Start: 2024-04-15 | End: 2024-04-15

## 2024-04-15 RX ADMIN — LOSARTAN POTASSIUM 100 MILLIGRAM(S): 100 TABLET, FILM COATED ORAL at 05:58

## 2024-04-15 RX ADMIN — Medication 20 MILLIGRAM(S): at 05:58

## 2024-04-15 RX ADMIN — ENOXAPARIN SODIUM 70 MILLIGRAM(S): 100 INJECTION SUBCUTANEOUS at 05:58

## 2024-04-15 RX ADMIN — ATORVASTATIN CALCIUM 40 MILLIGRAM(S): 80 TABLET, FILM COATED ORAL at 21:50

## 2024-04-15 RX ADMIN — Medication 50 MILLIGRAM(S): at 14:02

## 2024-04-15 RX ADMIN — Medication 150 MILLIGRAM(S): at 21:50

## 2024-04-15 RX ADMIN — ENOXAPARIN SODIUM 70 MILLIGRAM(S): 100 INJECTION SUBCUTANEOUS at 17:15

## 2024-04-15 RX ADMIN — Medication 50 MILLIGRAM(S): at 05:58

## 2024-04-15 NOTE — PROGRESS NOTE ADULT - SUBJECTIVE AND OBJECTIVE BOX
PEDRO SOTERO  68y Female    CHIEF COMPLAINT:    Patient is a 68y old  Female who presents with a chief complaint of Atrial flutter     (2024 10:43)      INTERVAL HPI/OVERNIGHT EVENTS:    Patient seen and examined. Feels good. No cp. No palpitations    ROS: All other systems are negative.    Vital Signs:    T(F): 98.1 (04-15-24 @ 12:41), Max: 98.1 (04-15-24 @ 12:41)  HR: 86 (04-15-24 @ 14:10) (73 - 89)  BP: 129/71 (04-15-24 @ 14:10) (126/60 - 137/68)  RR: 18 (04-15-24 @ 12:41) (18 - 18)  SpO2: --  I&O's Summary    2024 07:01  -  15 Apr 2024 07:00  --------------------------------------------------------  IN: 160 mL / OUT: 0 mL / NET: 160 mL    15 Apr 2024 07:01  -  15 Apr 2024 14:35  --------------------------------------------------------  IN: 605 mL / OUT: 1 mL / NET: 604 mL      Daily     Daily Weight in k.1 (15 Apr 2024 05:37)  CAPILLARY BLOOD GLUCOSE          PHYSICAL EXAM:    GENERAL:  NAD  SKIN: No rashes or lesions  HENT: Atraumatic. Normocephalic. PERRL. Moist membranes.  NECK: Supple, No JVD. No lymphadenopathy.  PULMONARY: CTA B/L. No wheezing. No rales  CVS: Normal S1, S2. Rate and Rhythm are regular. No murmurs.  ABDOMEN/GI: Soft, Nontender, Nondistended; BS present  EXTREMITIES: Peripheral pulses intact. No edema B/L LE.  NEUROLOGIC:  No motor or sensory deficit.  PSYCH: Alert & oriented x 3    Consultant(s) Notes Reviewed:  [x ] YES  [ ] NO  Care Discussed with Consultants/Other Providers [ x] YES  [ ] NO    EKG reviewed  Telemetry reviewed    LABS:                        13.2   8.02  )-----------( 350      ( 15 Apr 2024 06:34 )             41.0     0415    139  |  102  |  11  ----------------------------<  100<H>  4.6   |  24  |  0.7    Ca    9.3      15 Apr 2024 06:34  Mg     2.2     04-15    TPro  6.4  /  Alb  4.1  /  TBili  1.3<H>  /  DBili  x   /  AST  22  /  ALT  27  /  AlkPhos  113  04-15              RADIOLOGY & ADDITIONAL TESTS:    < from: NM Nuclear Stress Pharmacologic Multiple (24 @ 10:25) >  Impression:  1. MILD REVERSIBLE DEFECT IN THE APICAL ANTERIOR/ANTEROLATERAL WALL OF   THE LEFT VENTRICLE CONSISTENT WITH ISCHEMIA.  2. NORMAL RESTING LEFT VENTRICULAR WALL MOTION AND WALL THICKENING.  3. LEFT VENTRICULAR EJECTION FRACTION OF  72 % WHICH IS WITHIN RANGE OF   NORMAL.    < end of copied text >  < from: TTE Echo w/ Contrast Follow Up Limited (24 @ 10:09) >    Summary:   1.Limited study. Endocardial visualization was enhanced with   intravenous echo contrast.   2. Normal global left ventricular systolic function. Left ventricular   ejection fraction, by visual estimation, is 55 to 60%. Normal diastolic   function. No regional wall motion abnormalities.   3. Normal right ventricular size and function.   4. Mild biatrial dilatation by visual assessment.   5. S/p mitral annuloplasty with residual mild regurgitation.   6. There is no evidence of pericardial effusion.      < end of copied text >    Imaging or report Personally Reviewed:  [x ] YES  [ ] NO    Medications:  Standing  atorvastatin 40 milliGRAM(s) Oral at bedtime  enoxaparin Injectable 70 milliGRAM(s) SubCutaneous every 12 hours  furosemide    Tablet 20 milliGRAM(s) Oral daily  hydrochlorothiazide 12.5 milliGRAM(s) Oral daily  losartan 100 milliGRAM(s) Oral daily  metoprolol tartrate 50 milliGRAM(s) Oral three times a day  regadenoson Injectable 0.4 milliGRAM(s) IV Push once    PRN Meds      Case discussed with resident    Care discussed with pt/family

## 2024-04-15 NOTE — PROGRESS NOTE ADULT - ASSESSMENT
67 yo female presented to the ED with complaints of palpitations. Her PMH includes of a benign meningioma s/p resection in 2015 at [facility], Robotic assisted Mitral valve repair for rheumatic heart disease in 2017 at [facility] following which she had an immediate admission for Afib.       Acute HFmrEF  Paroxysmal Atrial Flutter  CP             PLAN:    ·	Tele reviewed. In NSR now.   ·	Cont Metoprolol to 50 mg po q 8h. On d/c switch her to Metoprolol Succinate 150 mg po daily  ·	EKG on admission: Atrial flutter 148/min. 2:1 conduction (Interpreted by me)  ·	CTA chest reviewed. No acute PE. B/L ground glass opacities and small pleural effusion   ·	S/P DCCV on 4/12. In NSR now. EP recommended ischemia w/u  ·	Nuclear stress test showed mild reversible defect in the apical ant/anterolateral wall of the LV consistent with ischemia  ·	Cardiology f/u  ·	ECHO showed EF is 55-60%. No regional wall motion abnormalities.   ·	Cont Lovenox for now. On d/c switch her to Eliquis 5 mg po q 12h  ·	CTA chest noted. No acute PE. Ground glass opacities. Was in CHF on admission. Euvolemic now  ·	Cont Lasix 20 mg po daily  ·	Check i's and o's and daily wt  ·	Low salt diet and water restriction to 1.5 L/D  ·	If cleared by cardio can discharge her home    * Med rec reviewed. Plan of care d/w the pt. Time spent 38 minutes.     Progress Note Handoff    Pending (specify):  Consults_Cardiology f/u________, Tests_______, Test Results_______, Other________  Family discussion:  Disposition: Home___/SNF___/Other________/Unknown at this time________    Frantz Damian MD  Spectra: [id number]

## 2024-04-15 NOTE — PROGRESS NOTE ADULT - SUBJECTIVE AND OBJECTIVE BOX
24H events: none     Patient is a 68y old Female who presents with a chief complaint of palpitations (15 Apr 2024 14:35)    Primary diagnosis of Atrial flutter     Today is hospital day 5d. This morning patient was seen and examined at bedside, resting comfortably in bed.      PAST MEDICAL & SURGICAL HISTORY  Robotic MV repair     SOCIAL HISTORY:  Social History: neg x3      ALLERGIES:  No Known Allergies    MEDICATIONS:  STANDING MEDICATIONS  atorvastatin 40 milliGRAM(s) Oral at bedtime  enoxaparin Injectable 70 milliGRAM(s) SubCutaneous every 12 hours  furosemide    Tablet 20 milliGRAM(s) Oral daily  hydrochlorothiazide 12.5 milliGRAM(s) Oral daily  losartan 100 milliGRAM(s) Oral daily  metoprolol tartrate 50 milliGRAM(s) Oral three times a day  regadenoson Injectable 0.4 milliGRAM(s) IV Push once    PRN MEDICATIONS        LABS:                        13.2   8.02  )-----------( 350      ( 15 Apr 2024 06:34 )             41.0     04-15    139  |  102  |  11  ----------------------------<  100<H>  4.6   |  24  |  0.7    Ca    9.3      15 Apr 2024 06:34  Mg     2.2     04-15    TPro  6.4  /  Alb  4.1  /  TBili  1.3<H>  /  DBili  x   /  AST  22  /  ALT  27  /  AlkPhos  113  04-15      Urinalysis Basic - ( 15 Apr 2024 06:34 )    Color: x / Appearance: x / SG: x / pH: x  Gluc: 100 mg/dL / Ketone: x  / Bili: x / Urobili: x   Blood: x / Protein: x / Nitrite: x   Leuk Esterase: x / RBC: x / WBC x   Sq Epi: x / Non Sq Epi: x / Bacteria: x      RADIOLOGY:  Echo w/ Contrast Follow Up Limited (04.14.24):    1.Limited study. Endocardial visualization was enhanced with intravenous echo contrast.   2. Normal global left ventricular systolic function. Left ventricular ejection fraction, by visual estimation, is 55 to 60%. Normal diastolic function. No regional wall motion abnormalities.   3. Normal right ventricular size and function.   4. Mild biatrial dilatation by visual assessment.   5. S/p mitral annuloplasty with residual mild regurgitation.   6. There is no evidence of pericardial effusion.      VITALS:   T(F): 96.5  HR: 79  BP: 159/74  RR: 18      PHYSICAL EXAM:  GENERAL: NAD, well-groomed, well-developed  HEAD:  Atraumatic, Normocephalic  EYES: EOMI  NECK: Supple  NERVOUS SYSTEM:  Alert & Oriented X3, non focal   CHEST/LUNG: Clear to auscultation bilaterally; No rales, rhonchi, wheezing, or rubs  HEART: Regular rate and rhythm; No murmurs, rubs, or gallops  ABDOMEN: Soft, Nontender, Nondistended; Bowel sounds present  EXTREMITIES:  2+ Peripheral Pulses, No clubbing, cyanosis, or edema  LYMPH: No lymphadenopathy noted  SKIN: No rashes or lesions

## 2024-04-15 NOTE — PROGRESS NOTE ADULT - ASSESSMENT
68 year old female Pmhx of Htn, HLD, h/o MR s/p Mitral valve repair 2017 at [facility], h/o Afib post MVR(Not on AC) follows with Dr. Quesada (seen >1 yr ago) here with LEE, constant chest pressure, palpitations for last 3 days. Admitted for Aflutter 2:1 conduction rapid HR(180s), possible CHF. Cardiology consulted for further management of Aflutter and possible CHF.     # Aflutter 2:1 conduction with tachycardia(170-180)on admission  # Volume overload/CHF in setting of tachycardia/Aflutter  # h/o MR s/p Mitral valve repair 2017 at [facility]  # h/o Afib post MVR(Not on AC)  # HTN / HLD  - s/p cardioversion --> in NSR now   - hemodynamically stable and euvolemic on exam   - EKG: Atrial flutter with 2:1 A-V conduction, Left posterior fascicular block, ST & T wave abnormality  - Echo w/ Contrast Follow Up Limited (04.14.24): EF 55 to 60%. Normal diastolic function. No regional wall motion abnormalities. S/p mitral annuloplasty with residual mild regurgitation.  - c/w Metoprolol losartan and diuretics   - c/w A/C   - obtain cath report from [facility]  - plan for CCTA tomorrow   - OP Follow up with Dr. Quesada

## 2024-04-15 NOTE — PROGRESS NOTE ADULT - ATTENDING COMMENTS
On review of nuclear stress test, patient with anterior and anterolateral ischemia versus breast attenuation. She can tolerate > 4 METs. Low suspicion for obstructive CAD, and therefore, I recommend further work-up with CCTA. If nonobstructive disease, no further recommendations. If patient found to have severe lesion(s), she is asymptomatic and it would be a chronic stenosis. Therefore, LHC would be delayed for 30 days (while she remains on anticoagulation post-DCCV) and she can undergo LHC +/- PCI in the outpatient setting.     The above was discussed with the patient and her . Patient to follow up with EP and Dr. Quesada. Cardiology consult team to sign off.

## 2024-04-16 VITALS
DIASTOLIC BLOOD PRESSURE: 59 MMHG | RESPIRATION RATE: 18 BRPM | HEART RATE: 62 BPM | SYSTOLIC BLOOD PRESSURE: 117 MMHG | TEMPERATURE: 97 F

## 2024-04-16 PROCEDURE — 75574 CT ANGIO HRT W/3D IMAGE: CPT | Mod: 26

## 2024-04-16 PROCEDURE — 99239 HOSP IP/OBS DSCHRG MGMT >30: CPT

## 2024-04-16 RX ORDER — METOPROLOL TARTRATE 50 MG
3 TABLET ORAL
Qty: 90 | Refills: 3
Start: 2024-04-16 | End: 2024-08-13

## 2024-04-16 RX ORDER — APIXABAN 2.5 MG/1
1 TABLET, FILM COATED ORAL
Qty: 60 | Refills: 2
Start: 2024-04-16 | End: 2024-07-14

## 2024-04-16 RX ORDER — FUROSEMIDE 40 MG
1 TABLET ORAL
Qty: 30 | Refills: 0
Start: 2024-04-16 | End: 2024-05-15

## 2024-04-16 RX ORDER — METOPROLOL TARTRATE 50 MG
50 TABLET ORAL ONCE
Refills: 0 | Status: COMPLETED | OUTPATIENT
Start: 2024-04-16 | End: 2024-04-16

## 2024-04-16 RX ORDER — METOPROLOL TARTRATE 50 MG
5 TABLET ORAL ONCE
Refills: 0 | Status: DISCONTINUED | OUTPATIENT
Start: 2024-04-16 | End: 2024-04-16

## 2024-04-16 RX ADMIN — LOSARTAN POTASSIUM 100 MILLIGRAM(S): 100 TABLET, FILM COATED ORAL at 05:25

## 2024-04-16 RX ADMIN — ENOXAPARIN SODIUM 70 MILLIGRAM(S): 100 INJECTION SUBCUTANEOUS at 05:25

## 2024-04-16 RX ADMIN — Medication 50 MILLIGRAM(S): at 08:46

## 2024-04-16 RX ADMIN — Medication 20 MILLIGRAM(S): at 05:26

## 2024-04-16 RX ADMIN — Medication 50 MILLIGRAM(S): at 05:26

## 2024-04-16 NOTE — PROGRESS NOTE ADULT - ASSESSMENT
67 yo female presented to the ED with complaints of palpitations. Her PMH includes of a benign meningioma s/p resection in 2015 at [facility], Robotic assisted Mitral valve repair for rheumatic heart disease in 2017 at [facility] following which she had an immediate admission for Afib.       # Acute HFmrEF  # Paroxysmal Atrial Flutter  # CP  - s/p cardioversion --> in NSR now   - Cont Metoprolol to 50 mg po q 8h. On d/c switch her to Metoprolol Succinate 150 mg po daily  - CTA chest reviewed. No acute PE. B/L ground glass opacities and small pleural effusion   - S/P DCCV on 4/12. In NSR now. EP recommended ischemia w/u  - Nuclear stress test showed mild reversible defect in the apical ant/anterolateral wall of the LV consistent with ischemia  - ECHO showed EF is 55-60%. No regional wall motion abnormalities.   - Cont Lovenox for now. On d/c switch her to Eliquis 5 mg po q 12h  - CTA chest noted. No acute PE. Ground glass opacities. Was in CHF on admission. Euvolemic now  - Cont Lasix 20 mg po daily  - hydrochlorothiazide 12.5 milliGRAM(s) Oral daily  - losartan 100 milliGRAM(s) Oral daily  - metoprolol tartrate 50 milliGRAM(s) Oral three times a day  - Check i's and o's and daily wt  - Low salt diet and water restriction to 1.5 L/D  - Cardio: CCTA today    # HLD  atorvastatin 40 milliGRAM(s) Oral at bedtime         Progress Note Handoff  Pending (specify):  CCTA read, cardio follow up  Family discussion: updated  Disposition: unknown at this time

## 2024-04-16 NOTE — PROGRESS NOTE ADULT - SUBJECTIVE AND OBJECTIVE BOX
SOTERO VASQUEZ  68y  Female      Patient is a 68y old  Female who presents with a chief complaint of palpitations (15 Apr 2024 20:09)      INTERVAL HPI/OVERNIGHT EVENTS: no acute events overnights.       REVIEW OF SYSTEMS:  CONSTITUTIONAL: No fever, weight loss, or fatigue  RESPIRATORY: No cough, wheezing, chills or hemoptysis; No shortness of breath  CARDIOVASCULAR: No chest pain, palpitations, dizziness, or leg swelling  GASTROINTESTINAL: No abdominal or epigastric pain. No nausea, vomiting, or hematemesis; No diarrhea or constipation. No melena or hematochezia.  GENITOURINARY: No dysuria, frequency, hematuria, or incontinence  NEUROLOGICAL: No headaches, memory loss, loss of strength, numbness, or tremors  SKIN: No itching, burning, rashes, or lesions   MUSCULOSKELETAL: No joint pain or swelling; No muscle, back, or extremity pain  PSYCHIATRIC: No depression, anxiety, mood swings, or difficulty sleeping  All other review of systems negative    T(C): 35.9 (04-16-24 @ 13:24), Max: 36 (04-16-24 @ 05:24)  HR: 62 (04-16-24 @ 13:24) (62 - 79)  BP: 117/59 (04-16-24 @ 13:24) (117/59 - 159/74)  RR: 18 (04-16-24 @ 13:24) (18 - 18)  SpO2: --  Wt(kg): --Vital Signs Last 24 Hrs  T(C): 35.9 (16 Apr 2024 13:24), Max: 36 (16 Apr 2024 05:24)  T(F): 96.6 (16 Apr 2024 13:24), Max: 96.8 (16 Apr 2024 05:24)  HR: 62 (16 Apr 2024 13:24) (62 - 79)  BP: 117/59 (16 Apr 2024 13:24) (117/59 - 159/74)  BP(mean): --  RR: 18 (16 Apr 2024 13:24) (18 - 18)  SpO2: --      04-15-24 @ 07:01  -  04-16-24 @ 07:00  --------------------------------------------------------  IN: 605 mL / OUT: 1 mL / NET: 604 mL    04-16-24 @ 07:01  -  04-16-24 @ 16:46  --------------------------------------------------------  IN: 240 mL / OUT: 0 mL / NET: 240 mL        PHYSICAL EXAM:  GENERAL: elderly F, NAD  PSYCH: no agitation, baseline mentation  NERVOUS SYSTEM: AOx3  PULMONARY: symmetrical chest rise, no accessory muscle use  CARDIOVASCULAR: non tachycardic  GI: Nondistended   EXTREMITIES: No clubbing, cyanosis, or edema  SKIN: No rashes or lesions    Consultant(s) Notes Reviewed:  [x ] YES  [ ] NO    Discussed with Consultants/Other Providers [ x] YES     LABS                          13.2   8.02  )-----------( 350      ( 15 Apr 2024 06:34 )             41.0     04-15    139  |  102  |  11  ----------------------------<  100<H>  4.6   |  24  |  0.7    Ca    9.3      15 Apr 2024 06:34  Mg     2.2     04-15    TPro  6.4  /  Alb  4.1  /  TBili  1.3<H>  /  DBili  x   /  AST  22  /  ALT  27  /  AlkPhos  113  04-15      Urinalysis Basic - ( 15 Apr 2024 06:34 )    Color: x / Appearance: x / SG: x / pH: x  Gluc: 100 mg/dL / Ketone: x  / Bili: x / Urobili: x   Blood: x / Protein: x / Nitrite: x   Leuk Esterase: x / RBC: x / WBC x   Sq Epi: x / Non Sq Epi: x / Bacteria: x      RADIOLOGY & ADDITIONAL TESTS:  - no images 4/16  Imaging Personally Reviewed:  [ ] YES  [ ] NO    HEALTH ISSUES - PROBLEM Dx:    MEDICATIONS  (STANDING):  atorvastatin 40 milliGRAM(s) Oral at bedtime  enoxaparin Injectable 70 milliGRAM(s) SubCutaneous every 12 hours  furosemide    Tablet 20 milliGRAM(s) Oral daily  hydrochlorothiazide 12.5 milliGRAM(s) Oral daily  losartan 100 milliGRAM(s) Oral daily  metoprolol tartrate 50 milliGRAM(s) Oral three times a day  regadenoson Injectable 0.4 milliGRAM(s) IV Push once    MEDICATIONS  (PRN):  metoprolol tartrate Injectable 5 milliGRAM(s) IV Push once PRN tachy

## 2024-04-17 PROBLEM — Z00.00 ENCOUNTER FOR PREVENTIVE HEALTH EXAMINATION: Status: ACTIVE | Noted: 2024-04-17

## 2024-04-23 DIAGNOSIS — I48.92 UNSPECIFIED ATRIAL FLUTTER: ICD-10-CM

## 2024-04-23 DIAGNOSIS — I44.1 ATRIOVENTRICULAR BLOCK, SECOND DEGREE: ICD-10-CM

## 2024-04-23 DIAGNOSIS — I11.0 HYPERTENSIVE HEART DISEASE WITH HEART FAILURE: ICD-10-CM

## 2024-04-23 DIAGNOSIS — Z98.890 OTHER SPECIFIED POSTPROCEDURAL STATES: ICD-10-CM

## 2024-04-23 DIAGNOSIS — I44.5 LEFT POSTERIOR FASCICULAR BLOCK: ICD-10-CM

## 2024-04-23 DIAGNOSIS — I50.21 ACUTE SYSTOLIC (CONGESTIVE) HEART FAILURE: ICD-10-CM

## 2024-04-23 DIAGNOSIS — E78.5 HYPERLIPIDEMIA, UNSPECIFIED: ICD-10-CM

## 2024-04-23 DIAGNOSIS — I34.0 NONRHEUMATIC MITRAL (VALVE) INSUFFICIENCY: ICD-10-CM

## 2024-04-23 DIAGNOSIS — I51.3 INTRACARDIAC THROMBOSIS, NOT ELSEWHERE CLASSIFIED: ICD-10-CM

## 2024-04-23 DIAGNOSIS — Z11.52 ENCOUNTER FOR SCREENING FOR COVID-19: ICD-10-CM

## 2024-04-23 DIAGNOSIS — Z86.011 PERSONAL HISTORY OF BENIGN NEOPLASM OF THE BRAIN: ICD-10-CM

## 2024-05-02 ENCOUNTER — APPOINTMENT (OUTPATIENT)
Dept: CARDIOLOGY | Facility: CLINIC | Age: 69
End: 2024-05-02
Payer: MEDICARE

## 2024-05-02 VITALS
WEIGHT: 168 LBS | HEIGHT: 63 IN | BODY MASS INDEX: 29.77 KG/M2 | DIASTOLIC BLOOD PRESSURE: 70 MMHG | SYSTOLIC BLOOD PRESSURE: 124 MMHG | HEART RATE: 62 BPM

## 2024-05-02 DIAGNOSIS — Z98.890 OTHER SPECIFIED POSTPROCEDURAL STATES: ICD-10-CM

## 2024-05-02 DIAGNOSIS — R07.9 CHEST PAIN, UNSPECIFIED: ICD-10-CM

## 2024-05-02 DIAGNOSIS — Z13.6 ENCOUNTER FOR SCREENING FOR CARDIOVASCULAR DISORDERS: ICD-10-CM

## 2024-05-02 PROCEDURE — 93000 ELECTROCARDIOGRAM COMPLETE: CPT

## 2024-05-02 PROCEDURE — 99214 OFFICE O/P EST MOD 30 MIN: CPT

## 2024-05-02 PROCEDURE — 99204 OFFICE O/P NEW MOD 45 MIN: CPT | Mod: 25

## 2024-05-07 NOTE — HISTORY OF PRESENT ILLNESS
[FreeTextEntry1] : Previously seen at [facility].  MR s/p robotic repair at [facility] HTN HLD  New mild-mod SOB on exertion and substernal chest pressure for 1 week.  Went to ER on 4/10.  Found to be in Aflutter and cardioverted.

## 2024-05-07 NOTE — DISCUSSION/SUMMARY
[FreeTextEntry1] : SR today BP is controlled   Continue Eliquis Continue Metoprolol succinate 150 mg daily Follow-up 3-4 months

## 2024-06-05 ENCOUNTER — APPOINTMENT (OUTPATIENT)
Dept: ELECTROPHYSIOLOGY | Facility: CLINIC | Age: 69
End: 2024-06-05
Payer: MEDICARE

## 2024-06-05 ENCOUNTER — TRANSCRIPTION ENCOUNTER (OUTPATIENT)
Age: 69
End: 2024-06-05

## 2024-06-05 VITALS
TEMPERATURE: 97.1 F | SYSTOLIC BLOOD PRESSURE: 120 MMHG | DIASTOLIC BLOOD PRESSURE: 70 MMHG | WEIGHT: 164 LBS | BODY MASS INDEX: 29.06 KG/M2 | HEART RATE: 67 BPM | HEIGHT: 63 IN

## 2024-06-05 DIAGNOSIS — Z78.9 OTHER SPECIFIED HEALTH STATUS: ICD-10-CM

## 2024-06-05 DIAGNOSIS — Z85.828 PERSONAL HISTORY OF OTHER MALIGNANT NEOPLASM OF SKIN: ICD-10-CM

## 2024-06-05 DIAGNOSIS — I48.92 UNSPECIFIED ATRIAL FLUTTER: ICD-10-CM

## 2024-06-05 DIAGNOSIS — Z86.79 PERSONAL HISTORY OF OTHER DISEASES OF THE CIRCULATORY SYSTEM: ICD-10-CM

## 2024-06-05 DIAGNOSIS — Z80.0 FAMILY HISTORY OF MALIGNANT NEOPLASM OF DIGESTIVE ORGANS: ICD-10-CM

## 2024-06-05 DIAGNOSIS — Z82.49 FAMILY HISTORY OF ISCHEMIC HEART DISEASE AND OTHER DISEASES OF THE CIRCULATORY SYSTEM: ICD-10-CM

## 2024-06-05 DIAGNOSIS — Z86.39 PERSONAL HISTORY OF OTHER ENDOCRINE, NUTRITIONAL AND METABOLIC DISEASE: ICD-10-CM

## 2024-06-05 PROCEDURE — 99215 OFFICE O/P EST HI 40 MIN: CPT

## 2024-06-05 PROCEDURE — G2211 COMPLEX E/M VISIT ADD ON: CPT

## 2024-06-05 PROCEDURE — 93000 ELECTROCARDIOGRAM COMPLETE: CPT

## 2024-06-05 RX ORDER — METOPROLOL SUCCINATE 50 MG/1
50 TABLET, EXTENDED RELEASE ORAL DAILY
Qty: 90 | Refills: 3 | Status: ACTIVE | COMMUNITY

## 2024-06-05 RX ORDER — LOSARTAN POTASSIUM 100 MG/1
100 TABLET, FILM COATED ORAL DAILY
Refills: 0 | Status: ACTIVE | COMMUNITY

## 2024-06-05 RX ORDER — HYDROCHLOROTHIAZIDE 12.5 MG/1
12.5 TABLET ORAL DAILY
Refills: 0 | Status: ACTIVE | COMMUNITY

## 2024-06-05 RX ORDER — APIXABAN 5 MG/1
5 TABLET, FILM COATED ORAL
Qty: 180 | Refills: 3 | Status: ACTIVE | COMMUNITY

## 2024-06-05 RX ORDER — METOPROLOL SUCCINATE 100 MG/1
100 TABLET, EXTENDED RELEASE ORAL DAILY
Qty: 90 | Refills: 3 | Status: ACTIVE | COMMUNITY
Start: 2024-05-02

## 2024-06-05 RX ORDER — ATORVASTATIN CALCIUM 10 MG/1
10 TABLET, FILM COATED ORAL DAILY
Refills: 0 | Status: ACTIVE | COMMUNITY

## 2024-06-05 RX ORDER — FUROSEMIDE 20 MG/1
20 TABLET ORAL
Refills: 0 | Status: ACTIVE | COMMUNITY

## 2024-06-05 RX ORDER — MULTIVIT-MIN/IRON/FOLIC ACID/K 18-600-40
500 CAPSULE ORAL DAILY
Refills: 0 | Status: ACTIVE | COMMUNITY

## 2024-06-05 NOTE — ASSESSMENT
[FreeTextEntry1] : # Paroxysmal AFib/AFlutter - EF mildly depressed on ALINA, when pt was in AF - S/p recent cardioversion. Cont Eliquis 5 mg PO BID for CHADS VASc at least 3 (Age > 65, F, nonobstructive CAD). Denies any signs/symptoms of bleeding.  - Pulm referral for YANICK eval provided today - May benefit from AFib + Aflutter ablation but patient is not ready for it at this time. Will consider if she has a repeat episode.  # Craig with severe MR s/p robotic MV repair and left atrial appendage exclusion (Dr. Marte 5/23/2017 at [facility]) - Reviewed records   I have also advised the patient to go to the nearest emergency room if she experiences any chest pain, dyspnea, syncope, or has any other compelling symptoms.  Follow up in 3-4 months

## 2024-06-05 NOTE — CARDIOLOGY SUMMARY
[de-identified] : 6/5/2024 NSR (HR 67 bpm), PVCs  [de-identified] : 4/14/2024  1. MILD REVERSIBLE DEFECT IN THE APICAL ANTERIOR/ANTEROLATERAL WALL OF THE LEFT VENTRICLE CONSISTENT WITH ISCHEMIA. 2. NORMAL RESTING LEFT VENTRICULAR WALL MOTION AND WALL THICKENING. 3. LEFT VENTRICULAR EJECTION FRACTION OF  72 % WHICH IS WITHIN RANGE OF  NORMAL. [de-identified] : TTE with contrast 4/14/24 Normal global LVSF. EF 55-60%. Mild biatrial dilatation by visual assessment. S/p mitral annuloplasty with residual mild regurgitation. No evidence of pericardial effusion.  ALINA 4/12/24 EF 45-50% Mod-severe ADELINA. Mod LAE. Mild MR. Mild-mod TR.  JEANINE is surgically occluded with no evidence of leak. Thrombus noted within the body of the JEANINE. Successful synchronized cardioversion from atrial flutter to NSR via 200J of direct current. [de-identified] : CCTA 4/16/224 1. Small calcified plaque resulting in minimal narrowing. The total Agatston coronary artery calcium score equals 1, which corresponds to 48th percentile for age, gender and ethnicity. CAD-RADS 1. 2. There is small contrast opacification of the proximal left atrial appendage. The remainder of the left atrial appendage does not opacify with contrast and appears occluded. 3. New/increased nodular and branching opacities within the left lower lobe compared with CTA chest 4/10/2024 compatible with infectious/inflammatory etiology. [de-identified] : Cath 5/2017 No angiographic CAD

## 2024-06-05 NOTE — HISTORY OF PRESENT ILLNESS
[FreeTextEntry1] : Cardio: Dr. 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 Marte at [facility] 5/23/2017) with post op AFib (within 2 weeks of procedure) briefly on Amio for a few months. She has not had any other episodes of AFib for the last several years and she has not been on Amio or Eliquis since. She was admitted in April for chest pressure and noted to have AFlutter with RVR. She is s/p ALINA/CV.   Here for routine hospital follow up. Feels well. Sig decreased caffeine. No recurrence of symptoms. Compliant with Eliquis. Had to hold a few doses for dental work. Denies chest pain, palpitations, dizziness, lightheadedness, presyncope or syncope.

## 2024-06-05 NOTE — DISCUSSION/SUMMARY
[EKG obtained to assist in diagnosis and management of assessed problem(s)] : EKG obtained to assist in diagnosis and management of assessed problem(s) [FreeTextEntry1] : We had an extensive conversation regarding the nature of atrial fibrillation, including potential etiologies, underlying pathophysiology and natural history of the disease. In addition, the potential risk of thromboembolic events and assessment of that risk were discussed. I have emphasized the importance of continuing anticoagulation.   In addition, the maintenance of sinus rhythm along with adjuvant antiarrhythmic agents and catheter ablation therapy were discussed. The rationale for and risks of ablation therapy were discussed, including but not limited to bleeding, vascular injury, groin complications, cardiac perforation and tamponade, stroke, esophageal injury, pulmonary vein stenosis, need for pacemaker, need for cardiac surgery, and death. In addition, the long-term and ongoing nature of this therapy were also discussed, including the critical role of continued monitoring post-ablation and the potential for the necessity of repeat ablation procedures to definitively treat the condition.   The patient verbalized understanding of the discussion and all questions were addressed and answered. The patient would like to DEFER ablation.

## 2024-07-09 ENCOUNTER — INPATIENT (INPATIENT)
Facility: HOSPITAL | Age: 69
LOS: 1 days | Discharge: HOME | DRG: 179 | End: 2024-07-11
Attending: HOSPITALIST | Admitting: STUDENT IN AN ORGANIZED HEALTH CARE EDUCATION/TRAINING PROGRAM
Payer: MEDICARE

## 2024-07-09 VITALS
WEIGHT: 160.06 LBS | RESPIRATION RATE: 18 BRPM | HEIGHT: 63 IN | TEMPERATURE: 98 F | OXYGEN SATURATION: 98 % | HEART RATE: 146 BPM | DIASTOLIC BLOOD PRESSURE: 76 MMHG | SYSTOLIC BLOOD PRESSURE: 152 MMHG

## 2024-07-09 PROCEDURE — 93010 ELECTROCARDIOGRAM REPORT: CPT

## 2024-07-09 PROCEDURE — 99291 CRITICAL CARE FIRST HOUR: CPT

## 2024-07-09 NOTE — ED ADULT TRIAGE NOTE - CHIEF COMPLAINT QUOTE
I'm having palpitations, my pulse is 160. I had a cardioversion in April - patient  Patient took Metoprolol 50 mg one hour PTA  , Patient off Eliquis for a few day due to dental visit today, took one dose today , is on Abx

## 2024-07-10 ENCOUNTER — RESULT REVIEW (OUTPATIENT)
Age: 69
End: 2024-07-10

## 2024-07-10 DIAGNOSIS — Z86.79 PERSONAL HISTORY OF OTHER DISEASES OF THE CIRCULATORY SYSTEM: Chronic | ICD-10-CM

## 2024-07-10 DIAGNOSIS — Z98.890 OTHER SPECIFIED POSTPROCEDURAL STATES: Chronic | ICD-10-CM

## 2024-07-10 DIAGNOSIS — J69.0 PNEUMONITIS DUE TO INHALATION OF FOOD AND VOMIT: ICD-10-CM

## 2024-07-10 LAB
ALBUMIN SERPL ELPH-MCNC: 4.8 G/DL — SIGNIFICANT CHANGE UP (ref 3.5–5.2)
ALP SERPL-CCNC: 112 U/L — SIGNIFICANT CHANGE UP (ref 30–115)
ALT FLD-CCNC: 28 U/L — SIGNIFICANT CHANGE UP (ref 0–41)
ANION GAP SERPL CALC-SCNC: 13 MMOL/L — SIGNIFICANT CHANGE UP (ref 7–14)
AST SERPL-CCNC: 30 U/L — SIGNIFICANT CHANGE UP (ref 0–41)
BASOPHILS # BLD AUTO: 0.02 K/UL — SIGNIFICANT CHANGE UP (ref 0–0.2)
BASOPHILS NFR BLD AUTO: 0.2 % — SIGNIFICANT CHANGE UP (ref 0–1)
BILIRUB SERPL-MCNC: 0.8 MG/DL — SIGNIFICANT CHANGE UP (ref 0.2–1.2)
BUN SERPL-MCNC: 11 MG/DL — SIGNIFICANT CHANGE UP (ref 10–20)
CALCIUM SERPL-MCNC: 9.5 MG/DL — SIGNIFICANT CHANGE UP (ref 8.4–10.5)
CHLORIDE SERPL-SCNC: 103 MMOL/L — SIGNIFICANT CHANGE UP (ref 98–110)
CO2 SERPL-SCNC: 22 MMOL/L — SIGNIFICANT CHANGE UP (ref 17–32)
CREAT SERPL-MCNC: 0.6 MG/DL — LOW (ref 0.7–1.5)
EGFR: 98 ML/MIN/1.73M2 — SIGNIFICANT CHANGE UP
EOSINOPHIL # BLD AUTO: 0.06 K/UL — SIGNIFICANT CHANGE UP (ref 0–0.7)
EOSINOPHIL NFR BLD AUTO: 0.7 % — SIGNIFICANT CHANGE UP (ref 0–8)
GLUCOSE SERPL-MCNC: 114 MG/DL — HIGH (ref 70–99)
HCT VFR BLD CALC: 46.5 % — SIGNIFICANT CHANGE UP (ref 37–47)
HGB BLD-MCNC: 15.8 G/DL — SIGNIFICANT CHANGE UP (ref 12–16)
IMM GRANULOCYTES NFR BLD AUTO: 0.3 % — SIGNIFICANT CHANGE UP (ref 0.1–0.3)
LYMPHOCYTES # BLD AUTO: 1.68 K/UL — SIGNIFICANT CHANGE UP (ref 1.2–3.4)
LYMPHOCYTES # BLD AUTO: 19.2 % — LOW (ref 20.5–51.1)
MCHC RBC-ENTMCNC: 29 PG — SIGNIFICANT CHANGE UP (ref 27–31)
MCHC RBC-ENTMCNC: 34 G/DL — SIGNIFICANT CHANGE UP (ref 32–37)
MCV RBC AUTO: 85.3 FL — SIGNIFICANT CHANGE UP (ref 81–99)
MONOCYTES # BLD AUTO: 0.95 K/UL — HIGH (ref 0.1–0.6)
MONOCYTES NFR BLD AUTO: 10.8 % — HIGH (ref 1.7–9.3)
NEUTROPHILS # BLD AUTO: 6.03 K/UL — SIGNIFICANT CHANGE UP (ref 1.4–6.5)
NEUTROPHILS NFR BLD AUTO: 68.8 % — SIGNIFICANT CHANGE UP (ref 42.2–75.2)
NRBC # BLD: 0 /100 WBCS — SIGNIFICANT CHANGE UP (ref 0–0)
NT-PROBNP SERPL-SCNC: 327 PG/ML — HIGH (ref 0–300)
PLATELET # BLD AUTO: 312 K/UL — SIGNIFICANT CHANGE UP (ref 130–400)
PMV BLD: 9.6 FL — SIGNIFICANT CHANGE UP (ref 7.4–10.4)
POTASSIUM SERPL-MCNC: 4.5 MMOL/L — SIGNIFICANT CHANGE UP (ref 3.5–5)
POTASSIUM SERPL-SCNC: 4.5 MMOL/L — SIGNIFICANT CHANGE UP (ref 3.5–5)
PROT SERPL-MCNC: 7 G/DL — SIGNIFICANT CHANGE UP (ref 6–8)
RBC # BLD: 5.45 M/UL — HIGH (ref 4.2–5.4)
RBC # FLD: 13.9 % — SIGNIFICANT CHANGE UP (ref 11.5–14.5)
SODIUM SERPL-SCNC: 138 MMOL/L — SIGNIFICANT CHANGE UP (ref 135–146)
TROPONIN T, HIGH SENSITIVITY RESULT: <6 NG/L — SIGNIFICANT CHANGE UP (ref 6–13)
WBC # BLD: 8.77 K/UL — SIGNIFICANT CHANGE UP (ref 4.8–10.8)
WBC # FLD AUTO: 8.77 K/UL — SIGNIFICANT CHANGE UP (ref 4.8–10.8)

## 2024-07-10 PROCEDURE — 71045 X-RAY EXAM CHEST 1 VIEW: CPT | Mod: 26

## 2024-07-10 PROCEDURE — 93325 DOPPLER ECHO COLOR FLOW MAPG: CPT | Mod: 26

## 2024-07-10 PROCEDURE — 93312 ECHO TRANSESOPHAGEAL: CPT | Mod: 26

## 2024-07-10 PROCEDURE — 92960 CARDIOVERSION ELECTRIC EXT: CPT

## 2024-07-10 PROCEDURE — 86900 BLOOD TYPING SEROLOGIC ABO: CPT

## 2024-07-10 PROCEDURE — 99222 1ST HOSP IP/OBS MODERATE 55: CPT

## 2024-07-10 PROCEDURE — 99223 1ST HOSP IP/OBS HIGH 75: CPT

## 2024-07-10 PROCEDURE — 92960 CARDIOVERSION ELECTRIC EXT: CPT | Mod: 59

## 2024-07-10 PROCEDURE — 93010 ELECTROCARDIOGRAM REPORT: CPT | Mod: 59

## 2024-07-10 PROCEDURE — 85025 COMPLETE CBC W/AUTO DIFF WBC: CPT

## 2024-07-10 PROCEDURE — 93312 ECHO TRANSESOPHAGEAL: CPT

## 2024-07-10 PROCEDURE — 93320 DOPPLER ECHO COMPLETE: CPT

## 2024-07-10 PROCEDURE — 93320 DOPPLER ECHO COMPLETE: CPT | Mod: 26

## 2024-07-10 PROCEDURE — 84484 ASSAY OF TROPONIN QUANT: CPT

## 2024-07-10 PROCEDURE — 80053 COMPREHEN METABOLIC PANEL: CPT

## 2024-07-10 PROCEDURE — 83735 ASSAY OF MAGNESIUM: CPT

## 2024-07-10 PROCEDURE — 86850 RBC ANTIBODY SCREEN: CPT

## 2024-07-10 PROCEDURE — 86901 BLOOD TYPING SEROLOGIC RH(D): CPT

## 2024-07-10 PROCEDURE — 93325 DOPPLER ECHO COLOR FLOW MAPG: CPT

## 2024-07-10 PROCEDURE — 36415 COLL VENOUS BLD VENIPUNCTURE: CPT

## 2024-07-10 RX ORDER — DEXTROSE MONOHYDRATE AND SODIUM CHLORIDE 5; .3 G/100ML; G/100ML
1000 INJECTION, SOLUTION INTRAVENOUS ONCE
Refills: 0 | Status: COMPLETED | OUTPATIENT
Start: 2024-07-10 | End: 2024-07-10

## 2024-07-10 RX ORDER — ATORVASTATIN CALCIUM 20 MG/1
40 TABLET, FILM COATED ORAL AT BEDTIME
Refills: 0 | Status: DISCONTINUED | OUTPATIENT
Start: 2024-07-10 | End: 2024-07-11

## 2024-07-10 RX ORDER — DILTIAZEM HYDROCHLORIDE 240 MG/1
5 CAPSULE, EXTENDED RELEASE ORAL
Qty: 125 | Refills: 0 | Status: DISCONTINUED | OUTPATIENT
Start: 2024-07-10 | End: 2024-07-10

## 2024-07-10 RX ORDER — APIXABAN 5 MG/1
5 TABLET, FILM COATED ORAL EVERY 12 HOURS
Refills: 0 | Status: COMPLETED | OUTPATIENT
Start: 2024-07-10 | End: 2024-07-10

## 2024-07-10 RX ORDER — APIXABAN 5 MG/1
5 TABLET, FILM COATED ORAL EVERY 12 HOURS
Refills: 0 | Status: DISCONTINUED | OUTPATIENT
Start: 2024-07-10 | End: 2024-07-11

## 2024-07-10 RX ORDER — DILTIAZEM HYDROCHLORIDE 240 MG/1
5 CAPSULE, EXTENDED RELEASE ORAL ONCE
Refills: 0 | Status: DISCONTINUED | OUTPATIENT
Start: 2024-07-10 | End: 2024-07-10

## 2024-07-10 RX ORDER — LOSARTAN POTASSIUM 100 MG/1
100 TABLET, FILM COATED ORAL DAILY
Refills: 0 | Status: DISCONTINUED | OUTPATIENT
Start: 2024-07-10 | End: 2024-07-11

## 2024-07-10 RX ORDER — ACETAMINOPHEN 325 MG
650 TABLET ORAL EVERY 6 HOURS
Refills: 0 | Status: DISCONTINUED | OUTPATIENT
Start: 2024-07-10 | End: 2024-07-11

## 2024-07-10 RX ORDER — DILTIAZEM HYDROCHLORIDE 240 MG/1
10 CAPSULE, EXTENDED RELEASE ORAL ONCE
Refills: 0 | Status: COMPLETED | OUTPATIENT
Start: 2024-07-10 | End: 2024-07-10

## 2024-07-10 RX ORDER — METOPROLOL TARTRATE 50 MG
150 TABLET ORAL DAILY
Refills: 0 | Status: DISCONTINUED | OUTPATIENT
Start: 2024-07-10 | End: 2024-07-11

## 2024-07-10 RX ADMIN — LOSARTAN POTASSIUM 100 MILLIGRAM(S): 100 TABLET, FILM COATED ORAL at 09:48

## 2024-07-10 RX ADMIN — Medication 150 MILLIGRAM(S): at 08:29

## 2024-07-10 RX ADMIN — APIXABAN 5 MILLIGRAM(S): 5 TABLET, FILM COATED ORAL at 16:14

## 2024-07-10 RX ADMIN — DEXTROSE MONOHYDRATE AND SODIUM CHLORIDE 1000 MILLILITER(S): 5; .3 INJECTION, SOLUTION INTRAVENOUS at 00:15

## 2024-07-10 RX ADMIN — DILTIAZEM HYDROCHLORIDE 10 MILLIGRAM(S): 240 CAPSULE, EXTENDED RELEASE ORAL at 00:14

## 2024-07-10 RX ADMIN — APIXABAN 5 MILLIGRAM(S): 5 TABLET, FILM COATED ORAL at 05:16

## 2024-07-10 RX ADMIN — DILTIAZEM HYDROCHLORIDE 5 MG/HR: 240 CAPSULE, EXTENDED RELEASE ORAL at 05:16

## 2024-07-10 RX ADMIN — DILTIAZEM HYDROCHLORIDE 10 MILLIGRAM(S): 240 CAPSULE, EXTENDED RELEASE ORAL at 01:20

## 2024-07-10 RX ADMIN — ATORVASTATIN CALCIUM 40 MILLIGRAM(S): 20 TABLET, FILM COATED ORAL at 21:15

## 2024-07-10 NOTE — ED PROVIDER NOTE - CRITICAL CARE ATTENDING CONTRIBUTION TO CARE
This was a shared visit with the SASHA. I reviewed and verified the documentation.     I personally made/approved the management plan and take responsibility for the patient management. Yes.     I independently interpreted the following studies EKG.     Which showed: EKG - afib with RVR  XR - no infiltrate, no ptx.      67 yo F, hx of aflutter sp cardioversion 4/2024 on eliquis (last dose tonight but did miss 3 days previously due to having denatl work today ) follows with Dr. Quesada, MV disease 2/2 rheumatic heart disease sp robotic repair 2017 at [facility], benign meningioma sp resection 2015 here for assessment of palpitations,  on home monitor. No associated CP, dyspnea, nausea, vomiting, diaphoresis.     No recent illness, travel, trauma. Does note she has a small amount of caffeine today and a few alcoholic drinks this weekend -- both of which are out of the ordinary for her.     On arrival patient tachycardic, in afib, she is mentating well, has clear lungs, soft, NT, ND abdomen, no LE edema    Labs unremarkable, BNP very mildly elevated, CXR without infiltrate, ptx.  Patient took 50mg of metoprolol at home without improvement in HR so cardizem was used for rate control. Patient continues to be in aflutter. Given that patient had previously been cardioverted to NSR and, as far as she knows, had remained in that rhythm since, will admit for eval by patient's primary cardiologist Dr. Quesada and EP for consideration of repeat cardioversion.

## 2024-07-10 NOTE — CHART NOTE - NSCHARTNOTEFT_GEN_A_CORE
Chart reviewed.  Indication for ALINA: Atrial fibrillation. ALINA +/- direct current cardioversion.   Indications, risks, and benefits were discussed with the patient (severe complications including esophageal perforation and death at ~<1/5000, and mild complications such as throat discomfort, bruising/injury, bleeding, difficulty swallowing at a risk of ~1/10). All questions and concerns were answered. Will proceed with ALINA. No contraindications.

## 2024-07-10 NOTE — CONSULT NOTE ADULT - SUBJECTIVE AND OBJECTIVE BOX
Outpt cardiologist:  Dr. Quesada     HPI:  68 year old female Pmhx of Htn, HLD, h/o MR s/p Mitral valve repair 2017 at [facility], h/o Afib post Mitral annuloplasty on eliquis, follows with Dr. Quesada (seen >1 yr ago) here with palpitations for last 24 hrs. Patient states she was sitting in a rocking chair when she started to feel heart racing palpitations.  Patient states symptoms feel similar to past episode of a flutter for which she was admitted to the hospital and cardioverted.  Patient states she took extra 50 mg of metoprolol prior to arrival in the ED. Also patient stopped her Eliquis for the last 3 days for a dental procedure. She took only one dose today in the morning. Patient reports "eating and drinking alcohol more than usual" over the weekend. She denies no chest pain, no sob, no syncope       PAST MEDICAL & SURGICAL HISTORY  Atrial flutter    HTN (hypertension)    HLD (hyperlipidemia)        FAMILY HISTORY:  FAMILY HISTORY: no family hx of arrhythmia       SOCIAL HISTORY:  Social History: etoh use       ALLERGIES:  No Known Allergies      MEDICATIONS:  apixaban 5 milliGRAM(s) Oral every 12 hours  atorvastatin 40 milliGRAM(s) Oral at bedtime  diltiazem Infusion 5 mG/Hr (5 mL/Hr) IV Continuous <Continuous>  hydrochlorothiazide 12.5 milliGRAM(s) Oral daily  losartan 100 milliGRAM(s) Oral daily  metoprolol succinate  milliGRAM(s) Oral daily    PRN:  acetaminophen     Tablet .. 650 milliGRAM(s) Oral every 6 hours PRN  melatonin 3 milliGRAM(s) Oral at bedtime PRN      HOME MEDICATIONS:  Home Medications:  atorvastatin 40 mg oral tablet: 1 tab(s) orally once a day (at bedtime) (11 Apr 2024 01:06)  losartan-hydroCHLOROthiazide 100 mg-12.5 mg oral tablet: 1 tab(s) orally once a day (11 Apr 2024 01:06)      VITALS:   T(F): 97.7 (07-10 @ 08:08), Max: 97.9 (07-09 @ 23:42)  HR: 96 (07-10 @ 08:08) (90 - 146)  BP: 133/89 (07-10 @ 08:08) (125/57 - 152/76)  BP(mean): 104 (07-10 @ 08:08) (82 - 104)  RR: 18 (07-10 @ 08:08) (18 - 18)  SpO2: 94% (07-10 @ 08:08) (94% - 98%)    I&O's Summary      REVIEW OF SYSTEMS:  CONSTITUTIONAL: No weakness, fevers or chills  HEENT: No visual changes, neck/ear pain  RESPIRATORY: No cough, sob  CARDIOVASCULAR: See HPI  GASTROINTESTINAL: No abdominal pain. No nausea, vomiting, diarrhea   GENITOURINARY: No dysuria, frequency or hematuria  NEUROLOGICAL: No new focal deficits  SKIN: No new rashes    PHYSICAL EXAM:  General: Not in distress.  Non-toxic appearing.   HEENT: EOMI  Cardio: irregular, S1, S2, no murmur  Pulm: B/L BS.  No wheezing / crackles / rales  Abdomen: Soft, non-tender, non-distended. Normoactive bowel sounds  Extremities: No edema b/l le  Neuro: A&O x3. No focal deficits    LABS:                        15.8   8.77  )-----------( 312      ( 10 Jul 2024 00:10 )             46.5     07-10    138  |  103  |  11  ----------------------------<  114<H>  4.5   |  22  |  0.6<L>    Ca    9.5      10 Jul 2024 00:10    TPro  7.0  /  Alb  4.8  /  TBili  0.8  /  DBili  x   /  AST  30  /  ALT  28  /  AlkPhos  112  07-10        RADIOLOGY:  TTE Echo w/ Contrast Follow Up Limited (04.14.24):   1.Limited study. Endocardial visualization was enhanced with intravenous echo contrast.   2. Normal global left ventricular systolic function. Left ventricular   ejection fraction, by visual estimation, is 55 to 60%. Normal diastolic   function. No regional wall motion abnormalities.   3. Normal right ventricular size and function.   4. Mild biatrial dilatation by visual assessment.   5. S/p mitral annuloplasty with residual mild regurgitation.   6. There is no evidence of pericardial effusion.

## 2024-07-10 NOTE — PRE-ANESTHESIA EVALUATION ADULT - BP NONINVASIVE DIASTOLIC (MM HG)
----- Message from Db Peralta sent at 3/20/2020 11:30 AM CDT -----  Contact: self  Pt is returning a from office in regards to some questions she needs to answer regarding her ankle.    Contact Info [phone] (home)     
Spoke w pt. Acute ankle and ft pn following twisting injury. Difficulty c amb. Will proceed w sched appt Monday unless otherwise instructed d/t SADIE Valiente  Orthopedic Clinical Assistant to Dr. Oumou Garcia     
69

## 2024-07-10 NOTE — ED PROVIDER NOTE - OBJECTIVE STATEMENT
68-year-old female past medical history of A-flutter on Eliquis and metoprolol 150 mg, HLD, HTN, presents with palpitations that started tonight.  Patient states she was sitting in a rocking chair when she started to feel heart racing palpitations.  Denies chest pain, shortness of breath, lightheadedness.  Patient states symptoms feel similar to past episode of a flutter for which she was admitted to the hospital and cardioverted.  Patient states she took extra 50 mg of metoprolol prior to arrival in the ED.  Patient reports "eating and drinking alcohol more than usual" over the weekend.

## 2024-07-10 NOTE — H&P ADULT - ATTENDING COMMENTS
69 yo female with pmh of Aflutter on Metoprolol 150 daily/Eliquis 5Q12H, s/p DCCV in April, 2024, follows with Dr Quesada and Dr Bran, h/o rheumatic heart disease s/p repair in 2017 in [facility], meningioma resection 2015, HTN, DLD, presents to the ED with acute onset palpitations, HR at home was 160s on home monitor. Patient took a dose of Metoprolol 50mg without improvement of symptoms so she came to the ED    Labs reviewed   On My exam, patient is comfortable, reports improving palpitations, currently on Cardizem drip 10  Rest of exam unremarkable     Patient is planned for ALINA/DCCV today with cardiology. Recommending continue with home medications and wean off Cardizem drip  s/p EP fide, who recommend outpatient follow up for possible ablation and sleep study with pulm     Patient can potentially be discharged after ALINA/DCCV pending cardiology recommendations  Patient is aware and agreeable with plan of care.     Patient seen at bedside, total time spent to evaluate and treat the patient's acute illness and chronic medical conditions as well as time spent reviewing prior records, labs, radiology, documenting in electronic medical records,  discussing medical plan with  medical team was more than 55 minutes with >50% of time spent face to face with patient, discussing with patient/family as well as coordination of care

## 2024-07-10 NOTE — CONSULT NOTE ADULT - ATTENDING COMMENTS
As above, 68-year-old with history of paroxysmal A. fib/flutter who presented with palpitations. Found to be in atrial flutter with variable conduction. -Keep n.p.o. for ALINA cardioversion today. Continue anticoagulation. Continue metoprolol and wean diltiazem as tolerated. EP evaluation for possible ablation discussion

## 2024-07-10 NOTE — ED PROVIDER NOTE - PHYSICAL EXAMINATION
Vital Signs: I have reviewed the initial vital signs.  CONSTITUTIONAL: Pt in no acute distress laying on stretcher.  SKIN: Skin exam is warm and dry, no acute rash.  HEAD: Normocephalic; atraumatic.  EYES: PERRL, EOM intact; conjunctiva and sclera clear.  ENT: No nasal discharge; airway clear.   NECK: Supple; non tender.  CARD: +irregular tachycardia. S1, S2 normal; no murmurs, gallops, or rubs.   RESP: CTAB. No wheezes, rales or rhonchi.  ABD:  soft; non-distended; non-tender; no hepatosplenomegaly.  MSK: Normal ROM. No clubbing, cyanosis or edema.

## 2024-07-10 NOTE — H&P ADULT - NSHPPHYSICALEXAM_GEN_ALL_CORE
GENERAL: NAD, lying in bed comfortably  HEAD:  Atraumatic, normocephalic  EYES: EOMI, PERRLA, conjunctiva and sclera clear  ENT: Moist mucous membranes  NECK: Supple, no JVD  HEART: Regular rate and rhythm, no murmurs, rubs, or gallops  LUNGS: Unlabored respirations.  Clear to auscultation bilaterally, no crackles, wheezing, or rhonchi  ABDOMEN: Soft, nontender, nondistended, +BS  EXTREMITIES: 2+ peripheral pulses bilaterally. No clubbing, cyanosis, or edema  NERVOUS SYSTEM:  A&Ox3, no focal deficits   SKIN: No rashes or lesions GENERAL: NAD, lying in bed comfortably  HEAD:  Atraumatic, normocephalic  EYES: EOMI,  conjunctiva and sclera clear  ENT: Moist mucous membranes  NECK: Supple, no JVD  HEART: Regular rate and rhythm, no murmurs, rubs, or gallops  LUNGS: Unlabored respirations.  Clear to auscultation bilaterally, no crackles, wheezing, or rhonchi  ABDOMEN: Soft, nontender, nondistended, +BS  EXTREMITIES: 2+ peripheral pulses bilaterally. No clubbing, cyanosis, or edema  NERVOUS SYSTEM:  A&Ox3, no focal deficits   SKIN: No rashes or lesions

## 2024-07-10 NOTE — H&P ADULT - NSICDXPASTMEDICALHX_GEN_ALL_CORE_FT
PAST MEDICAL HISTORY:  Atrial flutter     History of rheumatic heart disease     HLD (hyperlipidemia)     HTN (hypertension)

## 2024-07-10 NOTE — CONSULT NOTE ADULT - SUBJECTIVE AND OBJECTIVE BOX
HPI:    68-year-old female past medical history of A-flutter on Eliquis and metoprolol 150 mg, HLD, HTN, presents with palpitations that started tonight.  Patient states she was sitting in a rocking chair when she started to feel heart racing palpitations.  Denies chest pain, shortness of breath, lightheadedness.  Patient states symptoms feel similar to past episode of a flutter for which she was admitted to the hospital and cardioverted.  Patient states she took extra 50 mg of metoprolol prior to arrival in the ED.  Patient reports "eating and drinking alcohol more than usual" over the weekend    cardiology consulted for Aflutter with rapid VR  patient seen and examined at bedside, mentions having some palpitations, no chest pain, no sob, no syncope       PAST MEDICAL & SURGICAL HISTORY  Atrial flutter    HTN (hypertension)    HLD (hyperlipidemia)        FAMILY HISTORY:  FAMILY HISTORY:      SOCIAL HISTORY:  []smoker  []Alcohol  []Drug    ALLERGIES:  No Known Allergies      MEDICATIONS:  MEDICATIONS  (STANDING):  apixaban 5 milliGRAM(s) Oral every 12 hours  diltiazem Infusion 5 mG/Hr (5 mL/Hr) IV Continuous <Continuous>  diltiazem Infusion 5 mG/Hr (5 mL/Hr) IV Continuous <Continuous>    MEDICATIONS  (PRN):      HOME MEDICATIONS:  Home Medications:  atorvastatin 40 mg oral tablet: 1 tab(s) orally once a day (at bedtime) (11 Apr 2024 01:06)  losartan-hydroCHLOROthiazide 100 mg-12.5 mg oral tablet: 1 tab(s) orally once a day (11 Apr 2024 01:06)      VITALS:   T(F): 97.9 (07-09 @ 23:42), Max: 97.9 (07-09 @ 23:42)  HR: 90 (07-10 @ 01:20) (90 - 146)  BP: 125/57 (07-10 @ 01:20) (125/57 - 152/76)  BP(mean): 82 (07-10 @ 01:20) (82 - 82)  RR: 18 (07-10 @ 01:20) (18 - 18)  SpO2: 98% (07-10 @ 01:20) (98% - 98%)    I&O's Summary      REVIEW OF SYSTEMS:  CONSTITUTIONAL: No weakness, fevers or chills  EYES: No visual changes  ENT: No vertigo or throat pain   NECK: No pain or stiffness  RESPIRATORY: No cough, wheezing, hemoptysis; No shortness of breath  CARDIOVASCULAR: ++ palpitations   GASTROINTESTINAL: No abdominal or epigastric pain. No nausea, vomiting, or hematemesis; No diarrhea or constipation. No melena or hematochezia.  GENITOURINARY: No dysuria, frequency or hematuria  NEUROLOGICAL: No numbness or weakness  SKIN: No itching, no rashes  MSK: No pain    PHYSICAL EXAM:  NEURO: patient is awake , alert and oriented  GEN: Not in acute distress  NECK: no thyroid enlargement, no JVD  LUNGS: Clear to auscultation bilaterally   CARDIOVASCULAR: S1/S2 present, RRR , no murmurs or rubs, no carotid bruits,  + PP bilaterally  ABD: Soft, non-tender, non-distended, +BS  EXT: No TUYET  SKIN: Intact    LABS:                        15.8   8.77  )-----------( 312      ( 10 Jul 2024 00:10 )             46.5     07-10    138  |  103  |  11  ----------------------------<  114<H>  4.5   |  22  |  0.6<L>    Ca    9.5      10 Jul 2024 00:10    TPro  7.0  /  Alb  4.8  /  TBili  0.8  /  DBili  x   /  AST  30  /  ALT  28  /  AlkPhos  112  07-10              Troponin trend:            RADIOLOGY:  -CXR:  -TTE:     1.Limited study. Endocardial visualization was enhanced with   intravenous echo contrast.   2. Normal global left ventricular systolic function. Left ventricular   ejection fraction, by visual estimation, is 55 to 60%. Normal diastolic   function. No regional wall motion abnormalities.   3. Normal right ventricular size and function.   4. Mild biatrial dilatation by visual assessment.   5. S/p mitral annuloplasty with residual mild regurgitation.   6. There is no evidence of pericardial effusion.    -CCTA:  -STRESS TEST:  -CATHETERIZATION:    ECG: alfutter with RVR    TELEMETRY EVENTS:   HPI:    68-year-old female past medical history of A-flutter on Eliquis and metoprolol 150 mg, HLD, HTN, presents with palpitations that started tonight.  Patient states she was sitting in a rocking chair when she started to feel heart racing palpitations.  Denies chest pain, shortness of breath, lightheadedness.  Patient states symptoms feel similar to past episode of a flutter for which she was admitted to the hospital and cardioverted.  Patient states she took extra 50 mg of metoprolol prior to arrival in the ED.  Patient reports "eating and drinking alcohol more than usual" over the weekend    cardiology consulted for Aflutter with rapid VR  patient seen and examined at bedside, mentions having some palpitations, no chest pain, no sob, no syncope       PAST MEDICAL & SURGICAL HISTORY  Atrial flutter    HTN (hypertension)    HLD (hyperlipidemia)        FAMILY HISTORY:  FAMILY HISTORY:      SOCIAL HISTORY:  []smoker  []Alcohol  []Drug    ALLERGIES:  No Known Allergies      MEDICATIONS:  MEDICATIONS  (STANDING):  apixaban 5 milliGRAM(s) Oral every 12 hours  diltiazem Infusion 5 mG/Hr (5 mL/Hr) IV Continuous <Continuous>  diltiazem Infusion 5 mG/Hr (5 mL/Hr) IV Continuous <Continuous>    MEDICATIONS  (PRN):      HOME MEDICATIONS:  Home Medications:  atorvastatin 40 mg oral tablet: 1 tab(s) orally once a day (at bedtime) (11 Apr 2024 01:06)  losartan-hydroCHLOROthiazide 100 mg-12.5 mg oral tablet: 1 tab(s) orally once a day (11 Apr 2024 01:06)      VITALS:   T(F): 97.9 (07-09 @ 23:42), Max: 97.9 (07-09 @ 23:42)  HR: 90 (07-10 @ 01:20) (90 - 146)  BP: 125/57 (07-10 @ 01:20) (125/57 - 152/76)  BP(mean): 82 (07-10 @ 01:20) (82 - 82)  RR: 18 (07-10 @ 01:20) (18 - 18)  SpO2: 98% (07-10 @ 01:20) (98% - 98%)    I&O's Summary      REVIEW OF SYSTEMS:  CONSTITUTIONAL: No weakness, fevers or chills  EYES: No visual changes  ENT: No vertigo or throat pain   NECK: No pain or stiffness  RESPIRATORY: No cough, wheezing, hemoptysis; No shortness of breath  CARDIOVASCULAR: ++ palpitations   GASTROINTESTINAL: No abdominal or epigastric pain. No nausea, vomiting, or hematemesis; No diarrhea or constipation. No melena or hematochezia.  GENITOURINARY: No dysuria, frequency or hematuria  NEUROLOGICAL: No numbness or weakness  SKIN: No itching, no rashes  MSK: No pain    PHYSICAL EXAM:  NEURO: patient is awake , alert and oriented  GEN: Not in acute distress  NECK: no thyroid enlargement, no JVD  LUNGS: Clear to auscultation bilaterally   CARDIOVASCULAR: S1/S2 present, irregular, normal rate , no murmurs or rubs, no carotid bruits,  + PP bilaterally  ABD: Soft, non-tender, non-distended, +BS  EXT: No TUYET  SKIN: Intact    LABS:                        15.8   8.77  )-----------( 312      ( 10 Jul 2024 00:10 )             46.5     07-10    138  |  103  |  11  ----------------------------<  114<H>  4.5   |  22  |  0.6<L>    Ca    9.5      10 Jul 2024 00:10    TPro  7.0  /  Alb  4.8  /  TBili  0.8  /  DBili  x   /  AST  30  /  ALT  28  /  AlkPhos  112  07-10              Troponin trend:            RADIOLOGY:  -CXR:  -TTE:     1.Limited study. Endocardial visualization was enhanced with   intravenous echo contrast.   2. Normal global left ventricular systolic function. Left ventricular   ejection fraction, by visual estimation, is 55 to 60%. Normal diastolic   function. No regional wall motion abnormalities.   3. Normal right ventricular size and function.   4. Mild biatrial dilatation by visual assessment.   5. S/p mitral annuloplasty with residual mild regurgitation.   6. There is no evidence of pericardial effusion.    -CCTA:  -STRESS TEST:  -CATHETERIZATION:    ECG: alfutter with RVR    TELEMETRY EVENTS:

## 2024-07-10 NOTE — H&P ADULT - ASSESSMENT
Patient is a 67 yo F with PMHx of aflutter s/p cardioversion 4/2024 on eliquis (last dose 7/9 at night but did miss three days previously due to having dental work 7/9 in the AM) follows with Dr. Vivian Quesada and Dr. 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 Patient reports that she was sitting in a chair at home watching TV when she began to experience palpitations. She measured her heart rate at home, 160 on home monitor. Patient reports she took Metoprolol succinate 50 mg x1 and came to the ED.     Admitted to telemetry for cardioversion.    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 Took Metoprolol succinate 50 mg PO x1 and came to the ED  - on admission, VS significant for /76,    - EKG: AFlutter; on telemetry monitor noted to be in RVR  - s/p LR 1L bolus x1, Cardizem 10 mg IVP x2, and started on Cardizem drip in ED  - Patient held Eliquis x3 days for dental procedure 7/9 in the AM; took Eliquis 7/9 in the evening, given another dose 7/10 @ 5 AM in ED  - Cardiology Consult: recommended to given Metoprolol succinate 150 mg PO STAT, wean off of cardizem drip, and keep NPO for cardioversion with EP later today  - cardiology additionally recommended outpatient sleep studies, and EP follow up outpatient for possible ablation  - follows with Dr. Vivian Quesada (cardiology) and Dr. Venessa Bran (EP)  - pending EP consult  - c/w Eliquis 5 mg BID  - c/w Metoprolol succinate 150 mg qd    #HTN  #HLD  - c/w Losartan 100 mg qd  - c/w HCTZ 12.5 mg qd  - c/w Atorvastatin 40 mg qhs     #Benign meningioma sp resection 2015  - continue outpatient f/u    MISC  #DVT prophylaxis: Eliquis  #GI prophylaxis: not indicated  #Diet: NPO until cardioversion  #Activity: IAT  #Code status: Full Code  #Disposition: Admit to Telemetry   Patient is a 67 yo F with PMHx of aflutter s/p cardioversion 4/2024 on eliquis (last dose 7/9 at night but did miss three days previously due to having dental work 7/9 in the AM) follows with Dr. Vivian Quesada and Dr. 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 Patient reports that she was sitting in a chair at home watching TV when she began to experience palpitations. She measured her heart rate at home, 160 on home monitor. Patient reports she took Metoprolol succinate 50 mg x1 and came to the ED.     Admitted to telemetry for cardioversion.    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 Took Metoprolol succinate 50 mg PO x1 and came to the ED  - on admission, VS significant for /76,    - EKG: AFlutter; on telemetry monitor noted to be in RVR  - s/p LR 1L bolus x1, Cardizem 10 mg IVP x2, and started on Cardizem drip in ED  - Patient held Eliquis x3 days for dental procedure 7/9 in the AM; took Eliquis 7/9 in the evening, given another dose 7/10 @ 5 AM in ED  - Cardiology Consult: recommended to given Metoprolol succinate 150 mg PO STAT, wean off of cardizem drip, and keep NPO for cardioversion with EP later today  - cardiology additionally recommended outpatient sleep studies, and EP follow up outpatient for possible ablation  - follows with Dr. Vivian Quesada (cardiology) and Dr. Venessa Bran (EP)  - pending EP consult  - c/w Eliquis 5 mg BID  - c/w Metoprolol succinate 150 mg qd    #HTN  #HLD  - c/w Losartan 100 mg qd  - c/w HCTZ 12.5 mg qd  - c/w Atorvastatin 40 mg qhs     #Benign meningioma sp resection 2015  - continue outpatient f/u    MISC  #DVT prophylaxis: Eliquis  #GI prophylaxis: not indicated  #Diet: NPO until cardioversion  #Activity: IAT  #Code status: Full Code  #Disposition: Admit to Telemetry

## 2024-07-10 NOTE — H&P ADULT - NSHPLABSRESULTS_GEN_ALL_CORE
15.8   8.77  )-----------( 312      ( 10 Jul 2024 00:10 )             46.5       07-10    138  |  103  |  11  ----------------------------<  114<H>  4.5   |  22  |  0.6<L>    Ca    9.5      10 Jul 2024 00:10    TPro  7.0  /  Alb  4.8  /  TBili  0.8  /  DBili  x   /  AST  30  /  ALT  28  /  AlkPhos  112  07-10    Urinalysis Basic - ( 10 Jul 2024 00:10 )    Color: x / Appearance: x / SG: x / pH: x  Gluc: 114 mg/dL / Ketone: x  / Bili: x / Urobili: x   Blood: x / Protein: x / Nitrite: x   Leuk Esterase: x / RBC: x / WBC x   Sq Epi: x / Non Sq Epi: x / Bacteria: x

## 2024-07-10 NOTE — CONSULT NOTE ADULT - ASSESSMENT
68 year old female Pmhx of Htn, HLD, h/o MR s/p Mitral valve repair 2017 at [facility], h/o Afib post Mitral annuloplasty on eliquis, follows with Dr. Quesada (seen >1 yr ago) here with palpitations for last 24 hrs. Admitted for Aflutter 2:1 conduction rapid HR(150 to 160), Cardiology consulted for further management of Aflutter   patient seen and examined at bedside, mentions having some palpitations, no chest pain, no sob, no syncope   to note that the patient stopped her eliquis for the last 3 days for a dental procedure. she took only one dose today in the morning    Impression:  #Aflutter 2:1 conduction with tachycardia (170-180) on admission  # h/o MR s/p Mitral valve repair 2017 at [facility]  #h/o Afib /  aflutter post MVR on eliquis   #HTN  #HLD    # Recs:  - keep on telemetry   - keep NPO for AILNA/DCCV today   - cw eliquis 5 mg po BID for stroke prevention   - give metoprolol 150 mg XL po stat and cw daily dosage   - wean off cardizem drip and stop it before DCCV   - EP follow up for possible ablation as OP  - sleep studies as OP

## 2024-07-10 NOTE — H&P ADULT - HISTORY OF PRESENT ILLNESS
Patient is a 67 yo F with PMHx of aflutter s/p cardioversion 4/2024 on eliquis (last dose 7/9 at night but did miss three days previously due to having dental work 7/9 in the AM) follows with Dr. Vivian Quesada and Dr. 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 Patient reports that she was sitting in a chair at home watching TV when she began to experience palpitations. She measured her heart rate at home, 160 on home monitor. No associated CP, dyspnea, nausea, vomiting, diaphoresis. Patient reports she took Metoprolol succinate 50 mg x1 and came to the ED.     Vitals in ED: TMax 97.9, /76, , RR 18, SpO2 94% on RA  Labs in ED: pro-, otherwise all labs WNL  EKG: AFlutter; on telemetry monitor noted to be in RVR  Imaging in ED: CXR unremarkable    s/p LR 1L bolus x1, Cardizem 10 mg IVP x2, and started on Cardizem drip in ED. Given Eliquis 5 mg @ 5 AM on 7/10. Patient seen by cardiology in ED, recommended to given Metoprolol succinate 150 mg PO STAT, wean off of cardizem drip, and keep NPO for cardioversion with EP later today. Admitted to telemetry.  Patient is a 67 yo F with PMHx of aflutter s/p cardioversion 4/2024 on eliquis (last dose 7/9 at night but did miss three days previously due to having dental work 7/9 in the AM) follows with Dr. Vivian Quesada and Dr. 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 Patient reports that she was sitting in a chair at home watching TV when she began to experience palpitations. She measured her heart rate at home, 160 on home monitor. No associated CP, dyspnea, nausea, vomiting, diaphoresis. Patient reports she took Metoprolol succinate 50 mg x1 and came to the ED.     Vitals in ED: TMax 97.9, /76, , RR 18, SpO2 94% on RA  Labs in ED: pro-, otherwise all labs WNL  EKG: AFlutter; on telemetry monitor noted to be in RVR  Imaging in ED: CXR unremarkable    s/p LR 1L bolus x1, Cardizem 10 mg IVP x2, and started on Cardizem drip in ED. Given Eliquis 5 mg @ 5 AM on 7/10. Patient seen by cardiology in ED, recommended to given Metoprolol succinate 150 mg PO STAT, wean off of cardizem drip, and keep NPO for cardioversion with EP later today. Admitted to telemetry.

## 2024-07-10 NOTE — CONSULT NOTE ADULT - ASSESSMENT
*****************INCOMPLETE NOTE********************  *****************INCOMPLETE NOTE*******************        68 year old female Pmhx of Htn, HLD, h/o MR s/p Mitral valve repair 2017 at [facility], h/o Afib post Mitral annuloplasty on eliquis, follows with Dr. Quesada (seen >1 yr ago) here with palpitations for last 24 hrs. Patient states she took extra 50 mg of metoprolol prior to arrival in the ED. Also patient stopped her Eliquis for the last 3 days for a dental procedure. She took only one dose today in the morning. Patient reports "eating and drinking alcohol more than usual" over the weekend. She denies no chest pain, no sob, no syncope     EP consulted for further management of Aflutter.    # Aflutter 2:1 conduction with RVR   # h/o MR s/p Mitral valve repair 2017 at [facility]  # HTN / HLD  - tele: Aflutter   - EKG: Aflutter with RVR with 2:1 conduction  - TTE Echo w/ Contrast (04.14.24): EF 55 to 60%; S/p mitral annuloplasty with residual mild regurgitation.  - CT Angio Heart and Coronaries w/ IV Cont (04.16.24): Ca Score 1; There is small contrast opacification of the proximal left atrial appendage. The remainder of the left atrial appendage does not opacify with contrast and appears occluded.  - wean off Cardizem ggt  - c/w home metoprolol succinate, losartan-hctz and atorvastatin   - c/w Eliquis   - keep NPO for ALINA/DCCV today   - avoid etoh use   - outpt EP follow up for possible ablation           *****************INCOMPLETE NOTE********************  *****************INCOMPLETE NOTE*******************        68 year old female Pmhx of Htn, HLD, h/o MR s/p Mitral valve repair 2017 at [facility], h/o Afib post Mitral annuloplasty on eliquis, follows with Dr. Quesada (seen >1 yr ago) here with palpitations for last 24 hrs. Patient states she took extra 50 mg of metoprolol prior to arrival in the ED. Also patient stopped her Eliquis for the last 3 days for a dental procedure. She took only one dose today in the morning. Patient reports "eating and drinking alcohol more than usual" over the weekend. She denies no chest pain, no sob, no syncope     EP consulted for further management of Aflutter.    # Aflutter 2:1 conduction with RVR   # h/o MR s/p Mitral valve repair 2017 at [facility]  # HTN / HLD  - tele: Aflutter   - EKG: Aflutter with RVR with 2:1 conduction  - TTE Echo w/ Contrast (04.14.24): EF 55 to 60%; S/p mitral annuloplasty with residual mild regurgitation.  - CT Angio Heart and Coronaries w/ IV Cont (04.16.24): Ca Score 1; There is small contrast opacification of the proximal left atrial appendage. The remainder of the left atrial appendage does not opacify with contrast and appears occluded.  - wean off Cardizem ggt  - c/w home metoprolol succinate, losartan-hctz and atorvastatin   - c/w Eliquis   - keep NPO for ALINA/DCCV today   - avoid etoh use   - outpt EP follow up for possible ablation       68 year old female Pmhx of Htn, HLD, h/o MR s/p Mitral valve repair 2017 at [facility], h/o Afib post Mitral annuloplasty on eliquis, follows with Dr. Quesada (seen >1 yr ago) here with palpitations for last 24 hrs. Patient states she took extra 50 mg of metoprolol prior to arrival in the ED. Also patient stopped her Eliquis for the last 3 days for a dental procedure. She took only one dose today in the morning. Patient reports "eating and drinking alcohol more than usual" over the weekend. She denies no chest pain, no sob, no syncope     EP consulted for further management of Aflutter.    # Afib/Flutter with RVR   # h/o MR s/p Mitral valve repair 2017 at [facility]  # HTN / HLD  - tele:  Afib/Flutter with HR in 150s with ambulation   - EKG: Aflutter with RVR with 2:1 conduction  - TTE Echo w/ Contrast (04.14.24): EF 55 to 60%; S/p mitral annuloplasty with residual mild regurgitation.  - CT Angio Heart and Coronaries w/ IV Cont (04.16.24): Ca Score 1; There is small contrast opacification of the proximal left atrial appendage. The remainder of the left atrial appendage does not opacify with contrast and appears occluded.  - wean off Cardizem ggt  - c/w home metoprolol succinate, losartan-hctz and atorvastatin   - c/w Eliquis   - keep NPO for ALINA/DCCV today   - outpt EP follow up for discussing ablation

## 2024-07-10 NOTE — ED PROVIDER NOTE - CLINICAL SUMMARY MEDICAL DECISION MAKING FREE TEXT BOX
Labs unremarkable, BNP very mildly elevated, CXR without infiltrate, ptx.  Patient took 50mg of metoprolol at home without improvement in HR so cardizem was used for rate control. Patient continues to be in aflutter. Given that patient had previously been cardioverted to NSR and, as far as she knows, had remained in that rhythm since, will admit for eval by patient's primary cardiologist Dr. Quesada and EP for consideration of repeat cardioversion.

## 2024-07-10 NOTE — CHART NOTE - NSCHARTNOTEFT_GEN_A_CORE
POST OPERATIVE PROCEDURAL DOCUMENTATION    PRE-OP DIAGNOSIS: Atrial fibrillation    POST-OP DIAGNOSIS: Normal sinus rhythm     PROCEDURE: Transesophageal Echocardiogram     Primary Physician: Dr. Mcclendon   Cardiology Fellow: Dr. Messer    ANESTHESIA TYPE: Refer to anesthesia note  CONDITION: Good    Specimen removed: N/A  Implants: None    Estimated blood loss: None  Complications: None    After risks and benefits of procedures were explained, informed consent was obtained and placed in chart. Refer to Anesthesia note for sedation details. The ALINA probe was passed into the esophagus without difficulty. Transesophageal images were obtained. The ALINA probe was removed without difficulty and examined. There was no evidence for bleeding. The patient tolerated the procedure well without any immediate ALINA-related complications.      Preliminary Findings:    LA: Moderately enlarged.  JEANINE: s/p surgical JEANINE clipping. No evidence of flow on color doppler.  LV: LVEF was estimated at 55-60%.  MV: No MR, No MS.  AV: No AI, no AS.  RA: Moderately enlarged.  RV: Normal size and function.  TV: Trace TR.  PV: Trace PI  IAS: No PFO or ASD. No R -> L shunt.  Aorta: There was mild, non-mobile atheroma seen in the thoracic aorta.    DIAGNOSIS/IMPRESSION: Successful conversion to NSR.    Full report to follow    PLAN OF CARE:  [X] Return to inpatient bed when stable and fully awake.  [X] Continue Eliquis.  [X] No eating or drinking for 1 hour.  [X] EP follow up.  [X] No driving for 24 hours.    Results of procedure/ plan of care discussed with patient/  in detail. POST OPERATIVE PROCEDURAL DOCUMENTATION    PRE-OP DIAGNOSIS: Atrial fibrillation    POST-OP DIAGNOSIS: Normal sinus rhythm     PROCEDURE: Transesophageal Echocardiogram     Primary Physician: Dr. Mcclendon   Cardiology Fellow: Dr. Messer    ANESTHESIA TYPE: Refer to anesthesia note  CONDITION: Good    Specimen removed: N/A  Implants: None    Estimated blood loss: None  Complications: None    After risks and benefits of procedures were explained, informed consent was obtained and placed in chart. Refer to Anesthesia note for sedation details. The ALINA probe was passed into the esophagus without difficulty. Transesophageal images were obtained. The ALINA probe was removed without difficulty and examined. There was no evidence for bleeding. The patient tolerated the procedure well without any immediate ALINA-related complications.      Preliminary Findings:    LA: Moderately enlarged.  JEANINE: s/p surgical JEANINE clipping. No evidence of flow on color doppler.  LV: LVEF was estimated at 55-60%.  MV: No MR, No MS.  AV: No AI, no AS.  RA: Moderately enlarged.  RV: Normal size and function.  TV: Trace TR.  PV: Trace PI  IAS: No PFO or ASD. No R -> L shunt.  Aorta: There was mild, non-mobile atheroma seen in the thoracic aorta.    Patient successfully converted to sinus rhythm with 200 J of synchronized direct current cardioversion (DCCV) via AP pads.    DIAGNOSIS/IMPRESSION: Successful conversion to NSR.    Full report to follow    PLAN OF CARE:  [X] Return to inpatient bed when stable and fully awake.  [X] Continue Eliquis.  [X] No eating or drinking for 1 hour.  [X] EP follow up.  [X] No driving for 24 hours.    Results of procedure/ plan of care discussed with patient/  in detail. POST OPERATIVE PROCEDURAL DOCUMENTATION    PRE-OP DIAGNOSIS: Atrial fibrillation    POST-OP DIAGNOSIS: Normal sinus rhythm     PROCEDURE: Transesophageal Echocardiogram     Primary Physician: Dr. Mcclendon   Cardiology Fellow: Dr. Messer    ANESTHESIA TYPE: Refer to anesthesia note  CONDITION: Good    Specimen removed: N/A  Implants: None    Estimated blood loss: None  Complications: None    After risks and benefits of procedures were explained, informed consent was obtained and placed in chart. Refer to Anesthesia note for sedation details. The ALINA probe was passed into the esophagus without difficulty. Transesophageal images were obtained. The ALINA probe was removed without difficulty and examined. There was no evidence for bleeding. The patient tolerated the procedure well without any immediate AILNA-related complications.      Preliminary Findings:    LA: Mildlyy enlarged.  JEANINE: s/p surgical JEANINE clipping. No evidence of flow on color doppler.  LV: LVEF was estimated at 55-60%.  MV: No MR, No MS.  AV: No AI, no AS.  RA: Mildly enlarged.  RV: Normal size and function.  TV: Trace TR.  PV: Trace PI  IAS: No PFO or ASD. No R -> L shunt.  Aorta: There was mild, non-mobile atheroma seen in the thoracic aorta.    Patient successfully converted to sinus rhythm with 200 J of synchronized direct current cardioversion (DCCV) via AP pads.    DIAGNOSIS/IMPRESSION: Successful conversion to NSR.    Full report to follow    PLAN OF CARE:  [X] Return to inpatient bed when stable and fully awake.  [X] Continue Eliquis.  [X] No eating or drinking for 1 hour.  [X] EP follow up.  [X] No driving for 24 hours.    Results of procedure/ plan of care discussed with patient/  in detail.

## 2024-07-11 ENCOUNTER — TRANSCRIPTION ENCOUNTER (OUTPATIENT)
Age: 69
End: 2024-07-11

## 2024-07-11 ENCOUNTER — NON-APPOINTMENT (OUTPATIENT)
Age: 69
End: 2024-07-11

## 2024-07-11 VITALS
TEMPERATURE: 99 F | RESPIRATION RATE: 18 BRPM | HEART RATE: 62 BPM | DIASTOLIC BLOOD PRESSURE: 76 MMHG | SYSTOLIC BLOOD PRESSURE: 146 MMHG

## 2024-07-11 LAB
ALBUMIN SERPL ELPH-MCNC: 4 G/DL — SIGNIFICANT CHANGE UP (ref 3.5–5.2)
ALP SERPL-CCNC: 96 U/L — SIGNIFICANT CHANGE UP (ref 30–115)
ALT FLD-CCNC: 18 U/L — SIGNIFICANT CHANGE UP (ref 0–41)
ANION GAP SERPL CALC-SCNC: 14 MMOL/L — SIGNIFICANT CHANGE UP (ref 7–14)
AST SERPL-CCNC: 18 U/L — SIGNIFICANT CHANGE UP (ref 0–41)
BASOPHILS # BLD AUTO: 0.02 K/UL — SIGNIFICANT CHANGE UP (ref 0–0.2)
BASOPHILS NFR BLD AUTO: 0.2 % — SIGNIFICANT CHANGE UP (ref 0–1)
BILIRUB SERPL-MCNC: 1.4 MG/DL — HIGH (ref 0.2–1.2)
BUN SERPL-MCNC: 12 MG/DL — SIGNIFICANT CHANGE UP (ref 10–20)
CALCIUM SERPL-MCNC: 9.4 MG/DL — SIGNIFICANT CHANGE UP (ref 8.4–10.5)
CHLORIDE SERPL-SCNC: 98 MMOL/L — SIGNIFICANT CHANGE UP (ref 98–110)
CO2 SERPL-SCNC: 22 MMOL/L — SIGNIFICANT CHANGE UP (ref 17–32)
CREAT SERPL-MCNC: 0.7 MG/DL — SIGNIFICANT CHANGE UP (ref 0.7–1.5)
EGFR: 95 ML/MIN/1.73M2 — SIGNIFICANT CHANGE UP
EOSINOPHIL # BLD AUTO: 0.05 K/UL — SIGNIFICANT CHANGE UP (ref 0–0.7)
EOSINOPHIL NFR BLD AUTO: 0.6 % — SIGNIFICANT CHANGE UP (ref 0–8)
GLUCOSE SERPL-MCNC: 97 MG/DL — SIGNIFICANT CHANGE UP (ref 70–99)
HCT VFR BLD CALC: 43.4 % — SIGNIFICANT CHANGE UP (ref 37–47)
HGB BLD-MCNC: 14.4 G/DL — SIGNIFICANT CHANGE UP (ref 12–16)
IMM GRANULOCYTES NFR BLD AUTO: 0.3 % — SIGNIFICANT CHANGE UP (ref 0.1–0.3)
LYMPHOCYTES # BLD AUTO: 1.25 K/UL — SIGNIFICANT CHANGE UP (ref 1.2–3.4)
LYMPHOCYTES # BLD AUTO: 14.5 % — LOW (ref 20.5–51.1)
MAGNESIUM SERPL-MCNC: 2 MG/DL — SIGNIFICANT CHANGE UP (ref 1.8–2.4)
MCHC RBC-ENTMCNC: 28.7 PG — SIGNIFICANT CHANGE UP (ref 27–31)
MCHC RBC-ENTMCNC: 33.2 G/DL — SIGNIFICANT CHANGE UP (ref 32–37)
MCV RBC AUTO: 86.6 FL — SIGNIFICANT CHANGE UP (ref 81–99)
MONOCYTES # BLD AUTO: 0.78 K/UL — HIGH (ref 0.1–0.6)
MONOCYTES NFR BLD AUTO: 9.1 % — SIGNIFICANT CHANGE UP (ref 1.7–9.3)
NEUTROPHILS # BLD AUTO: 6.47 K/UL — SIGNIFICANT CHANGE UP (ref 1.4–6.5)
NEUTROPHILS NFR BLD AUTO: 75.3 % — HIGH (ref 42.2–75.2)
NRBC # BLD: 0 /100 WBCS — SIGNIFICANT CHANGE UP (ref 0–0)
PLATELET # BLD AUTO: 292 K/UL — SIGNIFICANT CHANGE UP (ref 130–400)
PMV BLD: 9.8 FL — SIGNIFICANT CHANGE UP (ref 7.4–10.4)
POTASSIUM SERPL-MCNC: 4.4 MMOL/L — SIGNIFICANT CHANGE UP (ref 3.5–5)
POTASSIUM SERPL-SCNC: 4.4 MMOL/L — SIGNIFICANT CHANGE UP (ref 3.5–5)
PROT SERPL-MCNC: 6.3 G/DL — SIGNIFICANT CHANGE UP (ref 6–8)
RBC # BLD: 5.01 M/UL — SIGNIFICANT CHANGE UP (ref 4.2–5.4)
RBC # FLD: 14.2 % — SIGNIFICANT CHANGE UP (ref 11.5–14.5)
SODIUM SERPL-SCNC: 134 MMOL/L — LOW (ref 135–146)
TROPONIN T, HIGH SENSITIVITY RESULT: 6 NG/L — SIGNIFICANT CHANGE UP (ref 6–13)
WBC # BLD: 8.6 K/UL — SIGNIFICANT CHANGE UP (ref 4.8–10.8)
WBC # FLD AUTO: 8.6 K/UL — SIGNIFICANT CHANGE UP (ref 4.8–10.8)

## 2024-07-11 PROCEDURE — 99239 HOSP IP/OBS DSCHRG MGMT >30: CPT

## 2024-07-11 RX ADMIN — LOSARTAN POTASSIUM 100 MILLIGRAM(S): 100 TABLET, FILM COATED ORAL at 05:59

## 2024-07-11 RX ADMIN — Medication 150 MILLIGRAM(S): at 05:59

## 2024-07-11 RX ADMIN — APIXABAN 5 MILLIGRAM(S): 5 TABLET, FILM COATED ORAL at 05:58

## 2024-07-11 NOTE — DISCHARGE NOTE PROVIDER - NSDCMRMEDTOKEN_GEN_ALL_CORE_FT
atorvastatin 40 mg oral tablet: 1 tab(s) orally once a day (at bedtime)  Eliquis 5 mg oral tablet: 1 tab(s) orally 2 times a day  losartan-hydroCHLOROthiazide 100 mg-12.5 mg oral tablet: 1 tab(s) orally once a day  Toprol-XL 50 mg oral tablet, extended release: 3 tab(s) orally once a day

## 2024-07-11 NOTE — DISCHARGE NOTE PROVIDER - HOSPITAL COURSE
Patient is a 67 yo F with past medical history of aflutter s/p cardioversion 4/2024 on eliquis (last dose 7/9 at night but did miss three days previously due to having dental work 7/9 in the AM) follows with Dr. Vivian Quesada and Dr. Venessa Bran, MV disease 2/2 rheumatic heart disease sp robotic repair 2017 at [facility], HTN, HLD, benign meningioma sp resection 2015 presented to the ED overnight with complain of palpitations. Patient reported that she was sitting in a chair at home watching TV when she began to experience palpitations. She found out her heart rate on home monitor to be 160. She then took Metoprolol succinate 50 mg one dose and came to the ED.     Vitals in ED were significant for /76 and .  Labs in ED were significant for pro-, otherwise all labs WNL.  EKG showed AFlutter; on telemetry monitor noted to be in Afib with RVR.  CXR was unremarkable.    s/p LR 1L bolus x1, Cardizem 10 mg IVP x2, and started on Cardizem drip in ED. Given Eliquis 5 mg @ 5 AM on 7/10. Patient seen by cardiology in ED, recommended to given Metoprolol succinate 150 mg PO STAT, wean off of cardizem drip. Patient successfully converted to sinus rhythm with 200 J of synchronized direct current cardioversion (DCCV) via AP pads. Then patient was admitted to telemetry for further monitoring. He was on metoprolol succinate, losartan, hydrocholorthiazide and statin.     Other home meds were continued. Labs were followed and electrolytes were repleted as necessary. Vitals were monitored. The patient is clinically stable and can be discharged with the advice of outpatient PCP, cardiologist and EP follow up.

## 2024-07-11 NOTE — DISCHARGE NOTE PROVIDER - CARE PROVIDERS DIRECT ADDRESSES
[email or web address]

## 2024-07-11 NOTE — DISCHARGE NOTE PROVIDER - NSDCPNSUBOBJ_GEN_ALL_CORE
Pt seen and examined at bedside. In NSR. As per EP c/w metoprolol, no further medication changes. Stable for dc.

## 2024-07-11 NOTE — PROGRESS NOTE ADULT - SUBJECTIVE AND OBJECTIVE BOX
SOTERO VASQUEZ 68y Female  MRN#: [id number]     Hospital Day: 1d    Pt is currently admitted with the primary diagnosis of  Pneumonitis due to inhalation of food or vomitus        SUBJECTIVE     Overnight events  None    Subjective complaints  Pt was evaluated this am. Patient denied any active complaints and per patient her symptoms are improving                                            ----------------------------------------------------------  OBJECTIVE  PAST MEDICAL & SURGICAL HISTORY  Atrial flutter    HTN (hypertension)    HLD (hyperlipidemia)    History of rheumatic heart disease    S/P resection of meningioma    History of mitral valve repair                                              -----------------------------------------------------------  ALLERGIES:  No Known Allergies                                            ------------------------------------------------------------    HOME MEDICATIONS  Home Medications:  atorvastatin 40 mg oral tablet: 1 tab(s) orally once a day (at bedtime) (11 Apr 2024 01:06)  losartan-hydroCHLOROthiazide 100 mg-12.5 mg oral tablet: 1 tab(s) orally once a day (11 Apr 2024 01:06)                           MEDICATIONS:  STANDING MEDICATIONS  apixaban 5 milliGRAM(s) Oral every 12 hours  atorvastatin 40 milliGRAM(s) Oral at bedtime  hydrochlorothiazide 12.5 milliGRAM(s) Oral daily  losartan 100 milliGRAM(s) Oral daily  metoprolol succinate  milliGRAM(s) Oral daily    PRN MEDICATIONS  acetaminophen     Tablet .. 650 milliGRAM(s) Oral every 6 hours PRN  melatonin 3 milliGRAM(s) Oral at bedtime PRN                                            ------------------------------------------------------------  VITAL SIGNS: Last 24 Hours  T(C): 36.5 (11 Jul 2024 05:29), Max: 36.7 (10 Jul 2024 16:39)  T(F): 97.7 (11 Jul 2024 05:29), Max: 98.1 (10 Jul 2024 16:39)  HR: 66 (11 Jul 2024 05:29) (56 - 74)  BP: 131/76 (11 Jul 2024 05:29) (99/64 - 131/76)  BP(mean): 104 (10 Jul 2024 16:48) (104 - 104)  RR: 18 (11 Jul 2024 05:29) (18 - 20)  SpO2: 98% (11 Jul 2024 05:29) (95% - 98%)      07-10-24 @ 07:01  -  07-11-24 @ 07:00  --------------------------------------------------------  IN: 200 mL / OUT: 0 mL / NET: 200 mL                                             --------------------------------------------------------------  LABS:                        14.4   8.60  )-----------( 292      ( 11 Jul 2024 05:51 )             43.4     07-11    134<L>  |  98  |  12  ----------------------------<  97  4.4   |  22  |  0.7    Ca    9.4      11 Jul 2024 05:51  Mg     2.0     07-11    TPro  6.3  /  Alb  4.0  /  TBili  1.4<H>  /  DBili  x   /  AST  18  /  ALT  18  /  AlkPhos  96  07-11      Urinalysis Basic - ( 11 Jul 2024 05:51 )    Color: x / Appearance: x / SG: x / pH: x  Gluc: 97 mg/dL / Ketone: x  / Bili: x / Urobili: x   Blood: x / Protein: x / Nitrite: x   Leuk Esterase: x / RBC: x / WBC x   Sq Epi: x / Non Sq Epi: x / Bacteria: x                                                            -------------------------------------------------------------  RADIOLOGY:  Xray Chest 1 View- PORTABLE-Urgent (07.10.24)  No radiographic evidence of acute cardiopulmonary disease.                                              --------------------------------------------------------------    PHYSICAL EXAM:  GENERAL: NAD, lying in bed comfortably  HEAD:  Atraumatic, Normocephalic  EYES: EOMI, conjunctiva and sclera clear  ENT: Moist mucous membranes  NECK: Supple, No JVD  CHEST/LUNG: Clear to auscultation bilaterally; No rales, rhonchi, wheezing, or rubs. Unlabored respirations  HEART: regular rate and rhythm; No murmurs, rubs, or gallops  ABDOMEN: Bowel sounds present; Soft, Nontender, Nondistended.    EXTREMITIES: Warm. No clubbing, cyanosis, or edema  NERVOUS SYSTEM:  Alert & Oriented X3. No focal deficits   SKIN: No rashes or lesions                                           --------------------------------------------------------------

## 2024-07-11 NOTE — DISCHARGE NOTE NURSING/CASE MANAGEMENT/SOCIAL WORK - NSDCPEELIQUIS_GEN_ALL_CORE
Addended by: MERCEDES ROSENBAUM on: 4/6/2023 12:46 PM     Modules accepted: Orders    
Apixaban/Eliquis - Compliance/Apixaban/Eliquis - Dietary Advice/Apixaban/Eliquis - Follow up monitoring/Apixaban/Eliquis - Potential for adverse drug reactions and interactions

## 2024-07-11 NOTE — DISCHARGE NOTE PROVIDER - CARE PROVIDER_API CALL
Sarmad Plasencia  Internal Medicine  [address] [place] [address]  Phone: [phone]  Fax: [phone]  Follow Up Time: 2 weeks    Vivian Quesada  Cardiovascular Disease  [address] [facility] [facility] [address]  [place] [address]  Phone: [phone]  Fax: [phone]  Follow Up Time: 2 weeks    Venessa Bran  Cardiovascular Disease  [address] [place] [id number]  Phone: [phone]  Fax: [phone]  Follow Up Time: 1 week

## 2024-07-11 NOTE — DISCHARGE NOTE PROVIDER - NSDCCAREPROVSEEN_GEN_ALL_CORE_FT
[facility] MEDICINE  [facility] CARDIOLOGY AND EP

## 2024-07-11 NOTE — PROGRESS NOTE ADULT - ASSESSMENT
Patient is a 67 yo F with PMHx of aflutter s/p cardioversion 4/2024 on eliquis (last dose 7/9 at night but did miss three days previously due to having dental work 7/9 in the AM) follows with Dr. Vivian Quesada and Dr. Venessa Bran, MV disease 2/2 rheumatic heart disease sp robotic repair 2017 at [facility], HTN, HLD, benign meningioma sp resection 2015 presented to the ED overnight on 7/10 for assessment of palpitations. Patient reports that she was sitting in a chair at home watching TV when she began to experience palpitations. She measured her heart rate at home, 160 on home monitor. No associated CP, dyspnea, nausea, vomiting, diaphoresis. Patient reports she took Metoprolol succinate 50 mg x1 and came to the ED.     Vitals in ED: TMax 97.9, /76, , RR 18, SpO2 94% on RA  Labs in ED: pro-, otherwise all labs WNL  EKG: AFlutter; on telemetry monitor noted to be in RVR  Imaging in ED: CXR unremarkable    s/p LR 1L bolus x1, Cardizem 10 mg IVP x2, and started on Cardizem drip in ED. Given Eliquis 5 mg @ 5 AM on 7/10. Patient seen by cardiology in ED, recommended to given Metoprolol succinate 150 mg PO STAT, wean off of cardizem drip, and keep NPO for cardioversion with EP later today. Admitted to telemetry.     A/P:  1A fib s/p cardioversion (7/10):  Patient successfully converted to sinus rhythm with 200 J of synchronized direct current cardioversion (DCCV) via AP pads.  Aflutter s/p cardioversion 4/2024 on Eliquis  MV disease 2/2 rheumatic heart disease s/p robotic repair 2017 at [facility]  - patient reports on 7/9 in the evening she was sitting in a chair at home watching TV when she began to experience palpitations --> measured HR on home monitor, 160 BPM. Took Metoprolol succinate 50 mg PO x1 and came to the ED  - on admission, VS significant for /76,    - EKG: AFlutter; on telemetry monitor noted to be in RVR  - s/p LR 1L bolus x1, Cardizem 10 mg IVP x2, and started on Cardizem drip in ED  - Patient held Eliquis x3 days for dental procedure 7/9 in the AM; took Eliquis 7/9 in the evening, given another dose 7/10 @ 5 AM in ED  - Cardiology Consult: recommended to given Metoprolol succinate 150 mg PO STAT, wean off of cardizem drip, and keep NPO for cardioversion with EP later today  - cardiology additionally recommended outpatient sleep studies, and EP follow up outpatient for possible ablation  - follows with Dr. Vivian Quesada (cardiology) and Dr. Venessa Bran (EP)  - pending EP consult  - c/w Eliquis 5 mg BID  - c/w Metoprolol succinate 150 mg qd    #HTN  #HLD  - c/w Losartan 100 mg qd  - c/w HCTZ 12.5 mg qd  - c/w Atorvastatin 40 mg qhs     #Benign meningioma sp resection 2015  - continue outpatient f/u    MISC  #DVT prophylaxis: Eliquis  #GI prophylaxis: not indicated  #Diet: NPO until cardioversion  #Activity: IAT  #Code status: Full Code  #Disposition: Admit to Telemetry

## 2024-07-11 NOTE — DISCHARGE NOTE PROVIDER - NSDCCPCAREPLAN_GEN_ALL_CORE_FT
PRINCIPAL DISCHARGE DIAGNOSIS  Diagnosis: Atrial flutter  Assessment and Plan of Treatment: You are a 68-year-old female with a medical history significant for atrial flutter status post cardioversion in April 2024, currently on Eliquis (last dose 7/9 at night, missed three days due to dental work on 7/9 AM), and followed by Dr. Vivian Quesada and Dr. Venessa Bran. Additionally, you have mitral valve disease secondary to rheumatic heart disease, status post robotic repair in 2017 at [facility], hypertension, hyperlipidemia, and a history of benign meningioma post-resection in 2015.  You presented to the ED overnight with complaints of palpitations. You experienced these symptoms while sitting at home watching TV, measuring your heart rate at 160 bpm on a home monitor. You took a single dose of Metoprolol succinate 50 mg and subsequently came to the ED.  In the ED, your vital signs were notable for a BP of 152/76 mmHg and HR of 146 bpm. Labs revealed a pro-BNP of 326, otherwise within normal limits. An EKG confirmed atrial flutter, which progressed to atrial fibrillation with rapid ventricular response (RVR) on telemetry. Chest X-ray was unremarkable.  You received a 1L LR bolus, two doses of Cardizem 10 mg IVP, and were started on a Cardizem drip. Eliquis 5 mg was administered at 5 AM on 7/10. Cardiology evaluated you in the ED and recommended Metoprolol succinate 150 mg orally as a single dose, with plans to taper off the Cardizem drip. You were successfully cardioverted to sinus rhythm with 200 J synchronized direct current cardioversion (DCCV) via anterior-posterior pads and subsequently admitted to telemetry for cardiac monitoring.   Please seek for medical attention if you experience palpitations, shortness of breath, fatigue, weakness, dizziness, consution, difficulty speaking,  numbness or worsening of any symptoms.

## 2024-07-11 NOTE — DISCHARGE NOTE PROVIDER - PROVIDER TOKENS
PROVIDER:[TOKEN:[[id number]],FOLLOWUP:[2 weeks]],PROVIDER:[TOKEN:[[id number]],FOLLOWUP:[2 weeks]],PROVIDER:[TOKEN:[[id number]],FOLLOWUP:[1 week]]

## 2024-07-11 NOTE — DISCHARGE NOTE NURSING/CASE MANAGEMENT/SOCIAL WORK - PATIENT PORTAL LINK FT
You can access the FollowMyHealth Patient Portal offered by [facility] by registering at the following website: [email or web address]. By joining [employer]’s FollowMyHealth portal, you will also be able to view your health information using other applications (apps) compatible with our system.

## 2024-07-12 PROBLEM — I48.92 UNSPECIFIED ATRIAL FLUTTER: Chronic | Status: ACTIVE | Noted: 2024-07-10

## 2024-07-12 PROBLEM — E78.5 HYPERLIPIDEMIA, UNSPECIFIED: Chronic | Status: ACTIVE | Noted: 2024-07-10

## 2024-07-12 PROBLEM — Z86.79 PERSONAL HISTORY OF OTHER DISEASES OF THE CIRCULATORY SYSTEM: Chronic | Status: ACTIVE | Noted: 2024-07-10

## 2024-07-12 PROBLEM — I10 ESSENTIAL (PRIMARY) HYPERTENSION: Chronic | Status: ACTIVE | Noted: 2024-07-10

## 2024-07-16 DIAGNOSIS — E78.5 HYPERLIPIDEMIA, UNSPECIFIED: ICD-10-CM

## 2024-07-16 DIAGNOSIS — Z79.01 LONG TERM (CURRENT) USE OF ANTICOAGULANTS: ICD-10-CM

## 2024-07-16 DIAGNOSIS — I05.9 RHEUMATIC MITRAL VALVE DISEASE, UNSPECIFIED: ICD-10-CM

## 2024-07-16 DIAGNOSIS — I10 ESSENTIAL (PRIMARY) HYPERTENSION: ICD-10-CM

## 2024-07-16 DIAGNOSIS — I48.92 UNSPECIFIED ATRIAL FLUTTER: ICD-10-CM

## 2024-07-23 ENCOUNTER — NON-APPOINTMENT (OUTPATIENT)
Age: 69
End: 2024-07-23

## 2024-07-23 ENCOUNTER — APPOINTMENT (OUTPATIENT)
Dept: CARDIOLOGY | Facility: CLINIC | Age: 69
End: 2024-07-23
Payer: MEDICARE

## 2024-07-23 VITALS — HEIGHT: 63 IN | OXYGEN SATURATION: 98 % | WEIGHT: 160 LBS | BODY MASS INDEX: 28.35 KG/M2

## 2024-07-23 DIAGNOSIS — Z13.6 ENCOUNTER FOR SCREENING FOR CARDIOVASCULAR DISORDERS: ICD-10-CM

## 2024-07-23 DIAGNOSIS — Z98.890 OTHER SPECIFIED POSTPROCEDURAL STATES: ICD-10-CM

## 2024-07-23 PROCEDURE — 93000 ELECTROCARDIOGRAM COMPLETE: CPT

## 2024-07-23 PROCEDURE — G2211 COMPLEX E/M VISIT ADD ON: CPT

## 2024-07-23 PROCEDURE — 99214 OFFICE O/P EST MOD 30 MIN: CPT

## 2024-07-23 NOTE — DISCUSSION/SUMMARY
[FreeTextEntry1] : Symptomatic paroxysmal Aflutter  SR today BP is controlled   Continue Eliquis Continue Metoprolol succinate 150 mg daily EP evaluation for ablation scheduled tomorrow Follow-up 3-4 months

## 2024-07-23 NOTE — HISTORY OF PRESENT ILLNESS
[FreeTextEntry1] : Previously seen at [facility].  History of MR s/p robotic repair at [facility], HTN, HLD.  New mild-mod SOB on exertion and substernal chest pressure for 1 week.  Went to ER on 4/10/2024.  Found to be in Aflutter and cardioverted.  Second ER visit for Aflutter.  Successful ALINA/CV on 7/10/2024.

## 2024-07-24 ENCOUNTER — APPOINTMENT (OUTPATIENT)
Dept: ELECTROPHYSIOLOGY | Facility: CLINIC | Age: 69
End: 2024-07-24
Payer: MEDICARE

## 2024-07-24 VITALS
HEART RATE: 61 BPM | SYSTOLIC BLOOD PRESSURE: 173 MMHG | HEIGHT: 63 IN | WEIGHT: 160 LBS | DIASTOLIC BLOOD PRESSURE: 100 MMHG | BODY MASS INDEX: 28.35 KG/M2 | TEMPERATURE: 97.1 F

## 2024-07-24 DIAGNOSIS — I10 ESSENTIAL (PRIMARY) HYPERTENSION: ICD-10-CM

## 2024-07-24 DIAGNOSIS — I48.92 UNSPECIFIED ATRIAL FLUTTER: ICD-10-CM

## 2024-07-24 DIAGNOSIS — Z85.828 PERSONAL HISTORY OF OTHER MALIGNANT NEOPLASM OF SKIN: ICD-10-CM

## 2024-07-24 DIAGNOSIS — E78.5 HYPERLIPIDEMIA, UNSPECIFIED: ICD-10-CM

## 2024-07-24 PROCEDURE — G2211 COMPLEX E/M VISIT ADD ON: CPT

## 2024-07-24 PROCEDURE — 99215 OFFICE O/P EST HI 40 MIN: CPT

## 2024-07-24 PROCEDURE — 93000 ELECTROCARDIOGRAM COMPLETE: CPT

## 2024-07-24 RX ORDER — DRONEDARONE 400 MG/1
400 TABLET, FILM COATED ORAL
Qty: 180 | Refills: 1 | Status: ACTIVE | COMMUNITY
Start: 2024-07-24 | End: 1900-01-01

## 2024-07-24 NOTE — DISCUSSION/SUMMARY
[FreeTextEntry1] : We had an extensive conversation regarding the nature of atrial fibrillation, including potential etiologies, underlying pathophysiology and natural history of the disease. In addition, the potential risk of thromboembolic events and assessment of that risk were discussed. I have emphasized the importance of continuing anticoagulation.   In addition, the maintenance of sinus rhythm along with adjuvant antiarrhythmic agents and catheter ablation therapy were discussed. The rationale for and risks of ablation therapy were discussed, including but not limited to bleeding, vascular injury, groin complications, cardiac perforation and tamponade, stroke, esophageal injury, pulmonary vein stenosis, need for pacemaker, need for cardiac surgery, and death. In addition, the long-term and ongoing nature of this therapy were also discussed, including the critical role of continued monitoring post-ablation and the potential for the necessity of repeat ablation procedures to definitively treat the condition.   The patient verbalized understanding of the discussion and all questions were addressed and answered. The patient would like to proceed with ablation. Will schedule pt for AFib/AFlutter ablation with PFA for Sept.  [EKG obtained to assist in diagnosis and management of assessed problem(s)] : EKG obtained to assist in diagnosis and management of assessed problem(s)

## 2024-07-24 NOTE — ASSESSMENT
[FreeTextEntry1] : # Paroxysmal AFib/AFlutter - EF mildly depressed on ALINA, when pt was in AF - S/p recent cardioversion again 7/10/2024. Cont Eliquis 5 mg PO BID for CHADS VASc at least 3 (Age > 65, F, nonobstructive CAD) despite JEANINE ligation. Denies any signs/symptoms of bleeding.  - Pulm referral for YANICK eval prev provided - Discussed AFib + AFlutter ablation with patient, which she would like to schedule for Sept after she returns form her trip. She would like to temporarily start an AAD to help maintain NSR. Will schedule pt for AFib/AFlutter ablation with PFA for Sept.   # Craig with severe MR s/p robotic MV repair and left atrial appendage exclusion (Dr. Marte 5/23/2017 at [facility]) - Reviewed records. ALINA without flow on color doppler for JEANINE.   I have also advised the patient to go to the nearest emergency room if she experiences any chest pain, dyspnea, syncope, or has any other compelling symptoms.  Follow up 3-4 weeks after procedure

## 2024-07-24 NOTE — CARDIOLOGY SUMMARY
[de-identified] : 7/24/2024 NSR (HR 61 bpm) 6/5/2024 NSR (HR 67 bpm), PVCs  [de-identified] : 4/14/2024  1. MILD REVERSIBLE DEFECT IN THE APICAL ANTERIOR/ANTEROLATERAL WALL OF THE LEFT VENTRICLE CONSISTENT WITH ISCHEMIA. 2. NORMAL RESTING LEFT VENTRICULAR WALL MOTION AND WALL THICKENING. 3. LEFT VENTRICULAR EJECTION FRACTION OF  72 % WHICH IS WITHIN RANGE OF  NORMAL. [de-identified] : ALINA 7/10/2024 EF 55-60%. mild biatrial enlargement. S/p mitral ring. Trivial MR. s/p successful cardioversion to NSR. S/p surgical JEANINE clipping with no evidence of flow on color doppler.  TTE with contrast 4/14/24 Normal global LVSF. EF 55-60%. Mild biatrial dilatation by visual assessment. S/p mitral annuloplasty with residual mild regurgitation. No evidence of pericardial effusion.  ALINA 4/12/24 EF 45-50% Mod-severe ADELINA. Mod LAE. Mild MR. Mild-mod TR.  JEANINE is surgically occluded with no evidence of leak. Thrombus noted within the body of the JEANINE. Successful synchronized cardioversion from atrial flutter to NSR via 200J of direct current. [de-identified] : CCTA 4/16/224 1. Small calcified plaque resulting in minimal narrowing. The total Agatston coronary artery calcium score equals 1, which corresponds to 48th percentile for age, gender and ethnicity. CAD-RADS 1. 2. There is small contrast opacification of the proximal left atrial appendage. The remainder of the left atrial appendage does not opacify with contrast and appears occluded. 3. New/increased nodular and branching opacities within the left lower lobe compared with CTA chest 4/10/2024 compatible with infectious/inflammatory etiology. [de-identified] : Cath 5/2017 No angiographic CAD

## 2024-07-24 NOTE — PHYSICAL EXAM
[Well Developed] : well developed [Well Nourished] : well nourished [No Acute Distress] : no acute distress [Obese] : obese [Normal S1, S2] : normal S1, S2 [Clear Lung Fields] : clear lung fields [Good Air Entry] : good air entry [No Respiratory Distress] : no respiratory distress  [Soft] : abdomen soft [Normal Gait] : normal gait [No Edema] : no edema [Moves all extremities] : moves all extremities [Normal Speech] : normal speech [Alert and Oriented] : alert and oriented

## 2024-07-24 NOTE — HISTORY OF PRESENT ILLNESS
[FreeTextEntry1] : Cardio: Dr. 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 Marte at [facility] 5/23/2017) with post op AFib (within 2 weeks of procedure) briefly on Amio for a few months. She has not had any other episodes of AFib for the last several years and she has not been on Amio or Eliquis since. She was admitted in April for chest pressure and noted to have AFlutter with RVR. She is s/p ALINA/CV.   Here for routine hospital follow up. Feels well. Sig decreased caffeine. No recurrence of symptoms. Compliant with Eliquis. Had to hold a few doses for dental work.   7/24/2024 Here for routine follow up after recent hospitalization for recurrent of Aflutter. S/p ALINA/CV 7/10/2024 . Denies chest pain, palpitations, dizziness, lightheadedness, presyncope or syncope.

## 2024-07-25 NOTE — ED PROVIDER NOTE - CLINICAL SUMMARY MEDICAL DECISION MAKING FREE TEXT BOX
1900  Bedside shift change report given to Namrata (oncoming nurse) by UMANG Ramos (offgoing nurse). Report included the following information SBAR, Kardex, ED Summary, Intake/Output, and Recent Results.    NP notified of a-fib and dilt drip restarted.     0645  NP and bedside for eval       0700  Bedside shift change report given to UMANG Zhang (oncoming nurse) by Namrata (offgoing nurse). Report included the following information SBAR, Kardex, ED Summary, Intake/Output, and Recent Results.         This patient was assisted with Intentional Toileting every 2 hours during this shift as appropriate.  Documentation of ambulation and output reflected on Flowsheet as appropriate.  Purposeful hourly rounding was completed using AIDET and 5Ps.  Outcomes of PHR documented as they occurred. Bed alarm in use as appropriate.  Dual Suction and ambubag in place.           Patient with new onset CHF    Independently interpretted by Crow Torres DO:  XR interpretation: Bilateral pleural effusion  EKG interpretation: A flutter    Patient admitted for CHF new onset CT reviewed by me.     Appropriate medications for patient's presenting complaints were ordered and effects were reassessed.  Patient's external records were reviewed. Additional history was obtained from.    Escalation to admission/observation was considered.  At this time, patient requires inpatient hospitalization.

## 2024-08-28 ENCOUNTER — RESULT REVIEW (OUTPATIENT)
Age: 69
End: 2024-08-28

## 2024-08-28 ENCOUNTER — OUTPATIENT (OUTPATIENT)
Dept: OUTPATIENT SERVICES | Facility: HOSPITAL | Age: 69
LOS: 1 days | End: 2024-08-28
Payer: MEDICARE

## 2024-08-28 VITALS
RESPIRATION RATE: 17 BRPM | OXYGEN SATURATION: 99 % | HEIGHT: 63 IN | DIASTOLIC BLOOD PRESSURE: 86 MMHG | SYSTOLIC BLOOD PRESSURE: 168 MMHG | WEIGHT: 160.06 LBS | HEART RATE: 58 BPM | TEMPERATURE: 97 F

## 2024-08-28 DIAGNOSIS — Z01.818 ENCOUNTER FOR OTHER PREPROCEDURAL EXAMINATION: ICD-10-CM

## 2024-08-28 DIAGNOSIS — Z98.890 OTHER SPECIFIED POSTPROCEDURAL STATES: Chronic | ICD-10-CM

## 2024-08-28 DIAGNOSIS — I48.91 UNSPECIFIED ATRIAL FIBRILLATION: Chronic | ICD-10-CM

## 2024-08-28 DIAGNOSIS — I48.19 OTHER PERSISTENT ATRIAL FIBRILLATION: ICD-10-CM

## 2024-08-28 PROBLEM — Z86.79 PERSONAL HISTORY OF OTHER DISEASES OF THE CIRCULATORY SYSTEM: Chronic | Status: INACTIVE | Noted: 2024-07-10 | Resolved: 2024-08-28

## 2024-08-28 LAB
ALBUMIN SERPL ELPH-MCNC: 4.7 G/DL — SIGNIFICANT CHANGE UP (ref 3.5–5.2)
ALP SERPL-CCNC: 104 U/L — SIGNIFICANT CHANGE UP (ref 30–115)
ALT FLD-CCNC: 23 U/L — SIGNIFICANT CHANGE UP (ref 0–41)
ANION GAP SERPL CALC-SCNC: 13 MMOL/L — SIGNIFICANT CHANGE UP (ref 7–14)
AST SERPL-CCNC: 19 U/L — SIGNIFICANT CHANGE UP (ref 0–41)
BASOPHILS # BLD AUTO: 0.04 K/UL — SIGNIFICANT CHANGE UP (ref 0–0.2)
BASOPHILS NFR BLD AUTO: 0.5 % — SIGNIFICANT CHANGE UP (ref 0–1)
BILIRUB SERPL-MCNC: 1.4 MG/DL — HIGH (ref 0.2–1.2)
BLD GP AB SCN SERPL QL: SIGNIFICANT CHANGE UP
BUN SERPL-MCNC: 12 MG/DL — SIGNIFICANT CHANGE UP (ref 10–20)
CALCIUM SERPL-MCNC: 10 MG/DL — SIGNIFICANT CHANGE UP (ref 8.4–10.5)
CHLORIDE SERPL-SCNC: 99 MMOL/L — SIGNIFICANT CHANGE UP (ref 98–110)
CO2 SERPL-SCNC: 24 MMOL/L — SIGNIFICANT CHANGE UP (ref 17–32)
CREAT SERPL-MCNC: 0.8 MG/DL — SIGNIFICANT CHANGE UP (ref 0.7–1.5)
EGFR: 81 ML/MIN/1.73M2 — SIGNIFICANT CHANGE UP
EOSINOPHIL # BLD AUTO: 0.05 K/UL — SIGNIFICANT CHANGE UP (ref 0–0.7)
EOSINOPHIL NFR BLD AUTO: 0.7 % — SIGNIFICANT CHANGE UP (ref 0–8)
GLUCOSE SERPL-MCNC: 101 MG/DL — HIGH (ref 70–99)
HCT VFR BLD CALC: 45.4 % — SIGNIFICANT CHANGE UP (ref 37–47)
HGB BLD-MCNC: 15.4 G/DL — SIGNIFICANT CHANGE UP (ref 12–16)
IMM GRANULOCYTES NFR BLD AUTO: 0.3 % — SIGNIFICANT CHANGE UP (ref 0.1–0.3)
LYMPHOCYTES # BLD AUTO: 1.57 K/UL — SIGNIFICANT CHANGE UP (ref 1.2–3.4)
LYMPHOCYTES # BLD AUTO: 20.6 % — SIGNIFICANT CHANGE UP (ref 20.5–51.1)
MCHC RBC-ENTMCNC: 29.7 PG — SIGNIFICANT CHANGE UP (ref 27–31)
MCHC RBC-ENTMCNC: 33.9 G/DL — SIGNIFICANT CHANGE UP (ref 32–37)
MCV RBC AUTO: 87.5 FL — SIGNIFICANT CHANGE UP (ref 81–99)
MONOCYTES # BLD AUTO: 0.68 K/UL — HIGH (ref 0.1–0.6)
MONOCYTES NFR BLD AUTO: 8.9 % — SIGNIFICANT CHANGE UP (ref 1.7–9.3)
NEUTROPHILS # BLD AUTO: 5.25 K/UL — SIGNIFICANT CHANGE UP (ref 1.4–6.5)
NEUTROPHILS NFR BLD AUTO: 69 % — SIGNIFICANT CHANGE UP (ref 42.2–75.2)
NRBC # BLD: 0 /100 WBCS — SIGNIFICANT CHANGE UP (ref 0–0)
PLATELET # BLD AUTO: 321 K/UL — SIGNIFICANT CHANGE UP (ref 130–400)
PMV BLD: 9.7 FL — SIGNIFICANT CHANGE UP (ref 7.4–10.4)
POTASSIUM SERPL-MCNC: 4.8 MMOL/L — SIGNIFICANT CHANGE UP (ref 3.5–5)
POTASSIUM SERPL-SCNC: 4.8 MMOL/L — SIGNIFICANT CHANGE UP (ref 3.5–5)
PROT SERPL-MCNC: 6.9 G/DL — SIGNIFICANT CHANGE UP (ref 6–8)
RBC # BLD: 5.19 M/UL — SIGNIFICANT CHANGE UP (ref 4.2–5.4)
RBC # FLD: 13.4 % — SIGNIFICANT CHANGE UP (ref 11.5–14.5)
SODIUM SERPL-SCNC: 136 MMOL/L — SIGNIFICANT CHANGE UP (ref 135–146)
WBC # BLD: 7.61 K/UL — SIGNIFICANT CHANGE UP (ref 4.8–10.8)
WBC # FLD AUTO: 7.61 K/UL — SIGNIFICANT CHANGE UP (ref 4.8–10.8)

## 2024-08-28 PROCEDURE — 86901 BLOOD TYPING SEROLOGIC RH(D): CPT

## 2024-08-28 PROCEDURE — 80053 COMPREHEN METABOLIC PANEL: CPT

## 2024-08-28 PROCEDURE — 85025 COMPLETE CBC W/AUTO DIFF WBC: CPT

## 2024-08-28 PROCEDURE — 99214 OFFICE O/P EST MOD 30 MIN: CPT | Mod: 25

## 2024-08-28 PROCEDURE — 86850 RBC ANTIBODY SCREEN: CPT

## 2024-08-28 PROCEDURE — 36415 COLL VENOUS BLD VENIPUNCTURE: CPT

## 2024-08-28 PROCEDURE — 86900 BLOOD TYPING SEROLOGIC ABO: CPT

## 2024-08-28 PROCEDURE — 71046 X-RAY EXAM CHEST 2 VIEWS: CPT | Mod: 26

## 2024-08-28 PROCEDURE — 71046 X-RAY EXAM CHEST 2 VIEWS: CPT

## 2024-08-28 NOTE — H&P PST ADULT - NSICDXPASTMEDICALHX_GEN_ALL_CORE_FT
PAST MEDICAL HISTORY:  Atrial flutter     Craig's disease     HLD (hyperlipidemia)     HTN (hypertension)     Meningioma

## 2024-08-28 NOTE — H&P PST ADULT - NSICDXPASTSURGICALHX_GEN_ALL_CORE_FT
PAST SURGICAL HISTORY:  Atrial fibrillation status post cardioversion     History of mitral valve repair     S/P bladder repair     S/P resection of meningioma

## 2024-08-28 NOTE — H&P PST ADULT - REASON FOR ADMISSION
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 Marte at [facility] 5/23/2017) with post op AFib (within 2 weeks of procedure) briefly on Amio for a few months. She has not had any other episodes of AFib for the last several years and she has not been on Amio or Eliquis since. She was admitted in April for chest pressure and noted to have AFlutter with RVR. She is s/p ALINA/CV. She states she had another episode of aflutter 7/9/24 with RVR. S/p ALINA/CV 7/10/2024.  Now for scheduled EP STUDY, MAPPING, AFIB ABLATION/AFLUTTER PFA, ALINA.

## 2024-08-28 NOTE — H&P PST ADULT - NSICDXFAMILYHX_GEN_ALL_CORE_FT
FAMILY HISTORY:  Father  Still living? No  FH: CHF (congestive heart failure), Age at diagnosis: Age Unknown    Mother  Still living? No  FH: colon cancer, Age at diagnosis: Age Unknown

## 2024-08-28 NOTE — H&P PST ADULT - HISTORY OF PRESENT ILLNESS
PATIENT DENIES CHEST PAIN, SHORTNESS OF BREATH, PALPITATIONS, COUGHING, FEVER, DYSURIA.    NO COUGH, FEVER, SORE THROAT, HEADACHE, LOSS OF TASTE OR SMELL. NO KNOWN EXPOSURE TO ANYONE WITH COVID. PATIENT WAS INSTRUCTED TO ISOLATE FROM NOW UNTIL THE SURGERY.    Anesthesia Alert  + Difficult Airway (CLASS IV)  NO--History of neck surgery or radiation  NO--Limited ROM of neck  NO--History of Malignant hyperthermia  NO--Personal or family history of Pseudocholinesterase deficiency  NO--Prior Anesthesia Complication  NO--Latex Allergy  NO--Loose teeth  NO--History of Rheumatoid Arthritis  NO--YANICK  + BLEEDING RISK (ON ELIQUIS)      DASI=9.89 METS  RCRI=0

## 2024-08-29 DIAGNOSIS — I48.19 OTHER PERSISTENT ATRIAL FIBRILLATION: ICD-10-CM

## 2024-08-29 DIAGNOSIS — Z01.818 ENCOUNTER FOR OTHER PREPROCEDURAL EXAMINATION: ICD-10-CM

## 2024-09-12 ENCOUNTER — TRANSCRIPTION ENCOUNTER (OUTPATIENT)
Age: 69
End: 2024-09-12

## 2024-09-12 ENCOUNTER — RESULT REVIEW (OUTPATIENT)
Age: 69
End: 2024-09-12

## 2024-09-12 ENCOUNTER — APPOINTMENT (OUTPATIENT)
Dept: ELECTROPHYSIOLOGY | Facility: HOSPITAL | Age: 69
End: 2024-09-12

## 2024-09-12 ENCOUNTER — INPATIENT (INPATIENT)
Facility: HOSPITAL | Age: 69
LOS: 0 days | Discharge: HOME | DRG: 310 | End: 2024-09-13
Attending: INTERNAL MEDICINE | Admitting: STUDENT IN AN ORGANIZED HEALTH CARE EDUCATION/TRAINING PROGRAM
Payer: MEDICARE

## 2024-09-12 VITALS
DIASTOLIC BLOOD PRESSURE: 76 MMHG | WEIGHT: 160.06 LBS | OXYGEN SATURATION: 97 % | HEART RATE: 60 BPM | RESPIRATION RATE: 20 BRPM | HEIGHT: 62.99 IN | SYSTOLIC BLOOD PRESSURE: 147 MMHG

## 2024-09-12 DIAGNOSIS — I48.91 UNSPECIFIED ATRIAL FIBRILLATION: Chronic | ICD-10-CM

## 2024-09-12 DIAGNOSIS — Z98.890 OTHER SPECIFIED POSTPROCEDURAL STATES: Chronic | ICD-10-CM

## 2024-09-12 DIAGNOSIS — I48.19 OTHER PERSISTENT ATRIAL FIBRILLATION: ICD-10-CM

## 2024-09-12 DIAGNOSIS — I48.91 UNSPECIFIED ATRIAL FIBRILLATION: ICD-10-CM

## 2024-09-12 LAB — BLD GP AB SCN SERPL QL: SIGNIFICANT CHANGE UP

## 2024-09-12 PROCEDURE — 93657 TX L/R ATRIAL FIB ADDL: CPT

## 2024-09-12 PROCEDURE — C9399: CPT

## 2024-09-12 PROCEDURE — 93325 DOPPLER ECHO COLOR FLOW MAPG: CPT | Mod: 26,59

## 2024-09-12 PROCEDURE — 93306 TTE W/DOPPLER COMPLETE: CPT | Mod: 26

## 2024-09-12 PROCEDURE — 93656 COMPRE EP EVAL ABLTJ ATR FIB: CPT

## 2024-09-12 PROCEDURE — 93312 ECHO TRANSESOPHAGEAL: CPT | Mod: 26

## 2024-09-12 PROCEDURE — 76937 US GUIDE VASCULAR ACCESS: CPT | Mod: 26,59

## 2024-09-12 PROCEDURE — 80048 BASIC METABOLIC PNL TOTAL CA: CPT

## 2024-09-12 PROCEDURE — 93623 PRGRMD STIMJ&PACG IV RX NFS: CPT

## 2024-09-12 PROCEDURE — 93623 PRGRMD STIMJ&PACG IV RX NFS: CPT | Mod: 26,59

## 2024-09-12 PROCEDURE — 93662 INTRACARDIAC ECG (ICE): CPT | Mod: 26,59

## 2024-09-12 PROCEDURE — 36415 COLL VENOUS BLD VENIPUNCTURE: CPT

## 2024-09-12 PROCEDURE — 93613 INTRACARDIAC EPHYS 3D MAPG: CPT | Mod: 59

## 2024-09-12 RX ORDER — ASCORBIC ACID/ASCORBATE SODIUM 500 MG
1 TABLET,CHEWABLE ORAL
Refills: 0 | DISCHARGE

## 2024-09-12 RX ORDER — PANTOPRAZOLE SODIUM 40 MG
40 TABLET, DELAYED RELEASE (ENTERIC COATED) ORAL ONCE
Refills: 0 | Status: COMPLETED | OUTPATIENT
Start: 2024-09-12 | End: 2024-09-12

## 2024-09-12 RX ORDER — ASCORBIC ACID/ASCORBATE SODIUM 500 MG
500 TABLET,CHEWABLE ORAL DAILY
Refills: 0 | Status: DISCONTINUED | OUTPATIENT
Start: 2024-09-12 | End: 2024-09-13

## 2024-09-12 RX ORDER — METOPROLOL TARTRATE 100 MG/1
1 TABLET ORAL
Refills: 0 | DISCHARGE

## 2024-09-12 RX ORDER — LOSARTAN POTASSIUM 50 MG/1
1 TABLET ORAL
Refills: 0 | DISCHARGE

## 2024-09-12 RX ORDER — METOPROLOL TARTRATE 100 MG/1
150 TABLET ORAL DAILY
Refills: 0 | Status: DISCONTINUED | OUTPATIENT
Start: 2024-09-12 | End: 2024-09-13

## 2024-09-12 RX ORDER — HYDROCHLOROTHIAZIDE 12.5 MG/1
1 CAPSULE ORAL
Refills: 0 | DISCHARGE

## 2024-09-12 RX ORDER — DRONEDARONE 400 MG/1
400 TABLET, FILM COATED ORAL
Refills: 0 | Status: DISCONTINUED | OUTPATIENT
Start: 2024-09-12 | End: 2024-09-12

## 2024-09-12 RX ORDER — APIXABAN 5 MG/1
5 TABLET, FILM COATED ORAL EVERY 12 HOURS
Refills: 0 | Status: DISCONTINUED | OUTPATIENT
Start: 2024-09-12 | End: 2024-09-13

## 2024-09-12 RX ORDER — LOSARTAN POTASSIUM 50 MG/1
100 TABLET ORAL DAILY
Refills: 0 | Status: DISCONTINUED | OUTPATIENT
Start: 2024-09-12 | End: 2024-09-13

## 2024-09-12 RX ORDER — PANTOPRAZOLE SODIUM 40 MG
40 TABLET, DELAYED RELEASE (ENTERIC COATED) ORAL
Refills: 0 | Status: DISCONTINUED | OUTPATIENT
Start: 2024-09-12 | End: 2024-09-13

## 2024-09-12 RX ADMIN — Medication 40 MILLIGRAM(S): at 21:21

## 2024-09-12 RX ADMIN — METOPROLOL TARTRATE 150 MILLIGRAM(S): 100 TABLET ORAL at 17:17

## 2024-09-12 RX ADMIN — LOSARTAN POTASSIUM 100 MILLIGRAM(S): 50 TABLET ORAL at 14:10

## 2024-09-12 RX ADMIN — Medication 40 MILLIGRAM(S): at 12:35

## 2024-09-12 RX ADMIN — APIXABAN 5 MILLIGRAM(S): 5 TABLET, FILM COATED ORAL at 21:20

## 2024-09-12 RX ADMIN — APIXABAN 5 MILLIGRAM(S): 5 TABLET, FILM COATED ORAL at 12:58

## 2024-09-12 NOTE — DISCHARGE NOTE PROVIDER - NSDCCPTREATMENT_GEN_ALL_CORE_FT
PRINCIPAL PROCEDURE  Procedure: Cardiac ablation  Findings and Treatment: - Please start taking pantoprazole 40 mg daily for 30 days.  - You should restart your eliquis  - You may shower today.  - Do not drive or operate heavy machinery for 3 days.  - Do not submerge in water (example: baths, swimming) for 1 week.  - No squatting, exertional activities, or lifting anything > 10 lbs for 1 week.  - Any sudden swelling, redness, fever, discharge, or severe pain, call the Electrophysiology Office at [phone].

## 2024-09-12 NOTE — DISCHARGE NOTE PROVIDER - NSDCFUSCHEDAPPT_GEN_ALL_CORE_FT
Venessa Bran  [facility] Physician [facility]  ELECTROPH [address]  Scheduled Appointment: 10/16/2024    Vivian Quesada  [facility] Physician [facility]  CARDIOLOGY [address]  Scheduled Appointment: 10/24/2024

## 2024-09-12 NOTE — DISCHARGE NOTE PROVIDER - CARE PROVIDER_API CALL
Venessa Bran  Cardiovascular Disease  [address] [place] [id number]  Phone: [phone]  Fax: [phone]  Scheduled Appointment: 10/16/2024 10:15 AM

## 2024-09-12 NOTE — PRE PROCEDURE NOTE - PRE PROCEDURE EVALUATION
I have personally seen and examined the patient. I agree with the history and physical//consult//progress note, which I have reviewed and noted any changes below.     Pre-op Diagnosis: Paroxysmal AFlutter    Procedure: ALINA, AFib Ablation and All Related Procedures

## 2024-09-12 NOTE — PROGRESS NOTE ADULT - SUBJECTIVE AND OBJECTIVE BOX
Electrophysiology Brief Post-Op Note    I have personally seen and examined the patient.  I agree with the history and physical which I have reviewed and noted any changes below.  09-12-24 @ 8:15    PRE-OP DIAGNOSIS: Paroxysmal AFib/AFlutter    POST-OP DIAGNOSIS: Paroxysmal AFib/AFlutter    PROCEDURE:   - ALINA   - PFA AFib Ablation (PVI, PW)     VASCULAR ACCESS (with ultrasound guidance)   - Right femoral vein: 17 Fr   - Left femoral vein: 8 Fr, 11 Fr long   - Right femoral artery: none    Physician: Venessa Bran MD  Assistant: None    ANESTHESIA TYPE:  [ X ] General Anesthesia  [  ] Sedation  [ X ] Local/Regional    ESTIMATED BLOOD LOSS:    20  mL    CONDITION  [  ] Critical  [  ] Serious  [  ]Fair  [ X ]Good    SPECIMENS REMOVED (IF APPLICABLE): N/A    IMPLANTS (IF APPLICABLE): N/A    FINDINGS: Paroxysmal AFib/AFlutter; negative EPS on Isuprel at end of case    COMPLICATIONS: None    PLAN OF CARE  -          Admit to telemetry  -	Start Eliquis 5 mg BID, first dose now, second dose at 10 pm   -	Start Protonix 40 mg daily tonight x 1 month  -	Bedrest until AM  -          Follow up with me in the office in 2-3 weeks

## 2024-09-12 NOTE — DISCHARGE NOTE PROVIDER - ATTENDING DISCHARGE PHYSICAL EXAMINATION:
Physical Exam  T(C): 36.2 (09-13-24 @ 08:30), Max: 36.6 (09-12-24 @ 13:22)  HR: 59 (09-13-24 @ 08:30) (55 - 67)  BP: 148/66 (09-13-24 @ 08:30) (130/60 - 170/76)  RR: 20 (09-13-24 @ 08:30) (14 - 25)  SpO2: 98% (09-13-24 @ 08:30) (96% - 100%)  Gen: NAD  CV: RRR, nl S1 and S2, no m/r/g, no LE edema, no JVD  Pulm: CTAB, no crackles  GI: soft, nontender  MSK: normal ROM  Extremities: warm  Neuro: A+Ox3  Psych: cooperative

## 2024-09-12 NOTE — DISCHARGE NOTE PROVIDER - NSDCMRMEDTOKEN_GEN_ALL_CORE_FT
atorvastatin 40 mg oral tablet: 1 tab(s) orally once a day (at bedtime)  Eliquis 5 mg oral tablet: 1 tab(s) orally 2 times a day  hydroCHLOROthiazide 12.5 mg oral capsule: 1 cap(s) orally once a day  losartan 100 mg oral tablet: 1 tab(s) orally once a day  metoprolol succinate 100 mg oral tablet, extended release: 1 tab(s) orally once a day  metoprolol succinate 50 mg oral capsule, extended release: 1 cap(s) orally once a day  Multaq 400 mg oral tablet: 1 tab(s) orally 2 times a day  Vitamin C 500 mg oral capsule: 1 cap(s) orally once a day   atorvastatin 40 mg oral tablet: 1 tab(s) orally once a day (at bedtime)  Eliquis 5 mg oral tablet: 1 tab(s) orally 2 times a day  hydroCHLOROthiazide 12.5 mg oral capsule: 1 cap(s) orally once a day  losartan 100 mg oral tablet: 1 tab(s) orally once a day  metoprolol succinate 100 mg oral tablet, extended release: 1 tab(s) orally once a day  metoprolol succinate 50 mg oral capsule, extended release: 1 cap(s) orally once a day  pantoprazole 40 mg oral delayed release tablet: 1 tab(s) orally once a day (before a meal)  Vitamin C 500 mg oral capsule: 1 cap(s) orally once a day

## 2024-09-12 NOTE — ASU PATIENT PROFILE, ADULT - FALL HARM RISK - HARM RISK INTERVENTIONS

## 2024-09-13 ENCOUNTER — TRANSCRIPTION ENCOUNTER (OUTPATIENT)
Age: 69
End: 2024-09-13

## 2024-09-13 VITALS
OXYGEN SATURATION: 98 % | SYSTOLIC BLOOD PRESSURE: 148 MMHG | DIASTOLIC BLOOD PRESSURE: 66 MMHG | TEMPERATURE: 97 F | HEART RATE: 59 BPM | RESPIRATION RATE: 20 BRPM

## 2024-09-13 LAB
ANION GAP SERPL CALC-SCNC: 10 MMOL/L — SIGNIFICANT CHANGE UP (ref 7–14)
BUN SERPL-MCNC: 9 MG/DL — LOW (ref 10–20)
CALCIUM SERPL-MCNC: 8.9 MG/DL — SIGNIFICANT CHANGE UP (ref 8.4–10.5)
CHLORIDE SERPL-SCNC: 100 MMOL/L — SIGNIFICANT CHANGE UP (ref 98–110)
CO2 SERPL-SCNC: 25 MMOL/L — SIGNIFICANT CHANGE UP (ref 17–32)
CREAT SERPL-MCNC: 0.6 MG/DL — LOW (ref 0.7–1.5)
EGFR: 98 ML/MIN/1.73M2 — SIGNIFICANT CHANGE UP
GLUCOSE SERPL-MCNC: 116 MG/DL — HIGH (ref 70–99)
POTASSIUM SERPL-MCNC: 4.5 MMOL/L — SIGNIFICANT CHANGE UP (ref 3.5–5)
POTASSIUM SERPL-SCNC: 4.5 MMOL/L — SIGNIFICANT CHANGE UP (ref 3.5–5)
SODIUM SERPL-SCNC: 135 MMOL/L — SIGNIFICANT CHANGE UP (ref 135–146)

## 2024-09-13 PROCEDURE — 99233 SBSQ HOSP IP/OBS HIGH 50: CPT

## 2024-09-13 RX ORDER — PANTOPRAZOLE SODIUM 40 MG
1 TABLET, DELAYED RELEASE (ENTERIC COATED) ORAL
Qty: 30 | Refills: 0
Start: 2024-09-13 | End: 2024-10-12

## 2024-09-13 RX ORDER — DRONEDARONE 400 MG/1
1 TABLET, FILM COATED ORAL
Refills: 0 | DISCHARGE

## 2024-09-13 RX ADMIN — Medication 40 MILLIGRAM(S): at 05:29

## 2024-09-13 RX ADMIN — METOPROLOL TARTRATE 150 MILLIGRAM(S): 100 TABLET ORAL at 05:30

## 2024-09-13 RX ADMIN — LOSARTAN POTASSIUM 100 MILLIGRAM(S): 50 TABLET ORAL at 05:30

## 2024-09-13 NOTE — PROGRESS NOTE ADULT - NS ATTEND AMEND GEN_ALL_CORE FT
s/p AF ablation.   Doing well. In NSR.   Groins healing well.   Continue Eliquis 5 mg PO BID uninterrupted  Follow up in 3-4 weeks

## 2024-09-13 NOTE — DISCHARGE NOTE NURSING/CASE MANAGEMENT/SOCIAL WORK - PATIENT PORTAL LINK FT
You can access the FollowMyHealth Patient Portal offered by [facility] by registering at the following website: [email or web address]. By joining [employer]’s FollowMyHealth portal, you will also be able to view your health information using other applications (apps) compatible with our system.

## 2024-09-13 NOTE — DISCHARGE NOTE NURSING/CASE MANAGEMENT/SOCIAL WORK - NSDCPEFALRISK_GEN_ALL_CORE
For information on Fall & Injury Prevention, visit: [email or web address] OR  [email or web address] OR  https://www.cdc.gov/steadi/patient.html

## 2024-09-13 NOTE — PROGRESS NOTE ADULT - ASSESSMENT
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 Marte at [facility] 5/23/2017) with post op AFib, has had ALINA/DCCVs in the past for afib/ aflutter. Was recently hospitalized for recurrent Aflutter on 7/2024. Patient was seen by Dr. Bran in outpatient setting and referred for ablation. On 9/12/24 patient underwent successful PFA for Atrial fibrillation. POD#1 and feeling well    Impression:  AF sp Ablation  Sev MR sp Repair  HTN  HLD    Plan:  - Continue Eliquis 5mg Q12h  - Start Protonix 40mg daily for one month  - Cont all other home medications  - No heavy lifting or squatting for one week  - Follow up with Dr Bran in one month

## 2024-09-13 NOTE — PROGRESS NOTE ADULT - SUBJECTIVE AND OBJECTIVE BOX
INTERVAL HPI/OVERNIGHT EVENTS:  No acute events overnight  Patient SP AF Ablation POD#1    MEDICATIONS  (STANDING):  apixaban 5 milliGRAM(s) Oral every 12 hours  ascorbic acid 500 milliGRAM(s) Oral daily  atorvastatin 40 milliGRAM(s) Oral at bedtime  hydrochlorothiazide 12.5 milliGRAM(s) Oral daily  losartan 100 milliGRAM(s) Oral daily  metoprolol succinate  milliGRAM(s) Oral daily  pantoprazole    Tablet 40 milliGRAM(s) Oral before breakfast    MEDICATIONS  (PRN):      Allergies    No Known Allergies    Intolerances        REVIEW OF SYSTEMS: No CP, palpitations, dizziness or SOB     Vital Signs Last 24 Hrs  T(C): 36.2 (13 Sep 2024 08:30), Max: 36.6 (13 Sep 2024 00:24)  T(F): 97.2 (13 Sep 2024 08:30), Max: 97.9 (13 Sep 2024 00:24)  HR: 59 (13 Sep 2024 08:30) (56 - 64)  BP: 148/66 (13 Sep 2024 08:30) (130/60 - 149/72)  BP(mean): 95 (13 Sep 2024 08:30) (86 - 103)  RR: 20 (13 Sep 2024 08:30) (19 - 25)  SpO2: 98% (13 Sep 2024 08:30) (96% - 98%)    Parameters below as of 13 Sep 2024 08:30  Patient On (Oxygen Delivery Method): room air        09-12-24 @ 07:01  -  09-13-24 @ 07:00  --------------------------------------------------------  IN: 220 mL / OUT: 1625 mL / NET: -1405 mL    Physical Exam  GENERAL: In no apparent distress, well nourished, and hydrated.  EYES: EOMI, PERRLA, conjunctiva and sclera clear  NECK: Supple  HEART: Regular rate and rhythm; No murmurs, rubs, or gallops.  PULMONARY: Clear to auscultation and perfusion.  No rales, wheezing, or rhonchi bilaterally.  EXTREMITIES:  2+ Peripheral Pulses, No clubbing, cyanosis, or edema  SKIN: Groins healing well  NEUROLOGICAL: Grossly nonfocal     LABS:    09-13    135  |  100  |  9<L>  ----------------------------<  116<H>  4.5   |  25  |  0.6<L>    Ca    8.9      13 Sep 2024 05:01        Urinalysis Basic - ( 13 Sep 2024 05:01 )    Color: x / Appearance: x / SG: x / pH: x  Gluc: 116 mg/dL / Ketone: x  / Bili: x / Urobili: x   Blood: x / Protein: x / Nitrite: x   Leuk Esterase: x / RBC: x / WBC x   Sq Epi: x / Non Sq Epi: x / Bacteria: x        RADIOLOGY & ADDITIONAL TESTS:   INTERVAL HPI/OVERNIGHT EVENTS:  No acute events overnight  Patient SP AF Ablation POD#1    MEDICATIONS  (STANDING):  apixaban 5 milliGRAM(s) Oral every 12 hours  ascorbic acid 500 milliGRAM(s) Oral daily  atorvastatin 40 milliGRAM(s) Oral at bedtime  hydrochlorothiazide 12.5 milliGRAM(s) Oral daily  losartan 100 milliGRAM(s) Oral daily  metoprolol succinate  milliGRAM(s) Oral daily  pantoprazole    Tablet 40 milliGRAM(s) Oral before breakfast    MEDICATIONS  (PRN):      Allergies    No Known Allergies    Intolerances        REVIEW OF SYSTEMS: No CP, palpitations, dizziness or SOB     Vital Signs Last 24 Hrs  T(C): 36.2 (13 Sep 2024 08:30), Max: 36.6 (13 Sep 2024 00:24)  T(F): 97.2 (13 Sep 2024 08:30), Max: 97.9 (13 Sep 2024 00:24)  HR: 59 (13 Sep 2024 08:30) (56 - 64)  BP: 148/66 (13 Sep 2024 08:30) (130/60 - 149/72)  BP(mean): 95 (13 Sep 2024 08:30) (86 - 103)  RR: 20 (13 Sep 2024 08:30) (19 - 25)  SpO2: 98% (13 Sep 2024 08:30) (96% - 98%)    Parameters below as of 13 Sep 2024 08:30  Patient On (Oxygen Delivery Method): room air        09-12-24 @ 07:01  -  09-13-24 @ 07:00  --------------------------------------------------------  IN: 220 mL / OUT: 1625 mL / NET: -1405 mL    Physical Exam  GENERAL: In no apparent distress, well nourished, and hydrated.  EYES: EOMI, PERRLA, conjunctiva and sclera clear  NECK: Supple  HEART: Regular rate and rhythm; No murmurs, rubs, or gallops.  PULMONARY: Clear to auscultation and perfusion.  No rales, wheezing, or rhonchi bilaterally.  EXTREMITIES:  2+ Peripheral Pulses, No clubbing, cyanosis, or edema  SKIN: Groins healing well. Right groin suture removed.  NEUROLOGICAL: Grossly nonfocal     LABS:    09-13    135  |  100  |  9<L>  ----------------------------<  116<H>  4.5   |  25  |  0.6<L>    Ca    8.9      13 Sep 2024 05:01        Urinalysis Basic - ( 13 Sep 2024 05:01 )    Color: x / Appearance: x / SG: x / pH: x  Gluc: 116 mg/dL / Ketone: x  / Bili: x / Urobili: x   Blood: x / Protein: x / Nitrite: x   Leuk Esterase: x / RBC: x / WBC x   Sq Epi: x / Non Sq Epi: x / Bacteria: x        RADIOLOGY & ADDITIONAL TESTS:

## 2024-09-18 DIAGNOSIS — Z86.011 PERSONAL HISTORY OF BENIGN NEOPLASM OF THE BRAIN: ICD-10-CM

## 2024-09-18 DIAGNOSIS — I10 ESSENTIAL (PRIMARY) HYPERTENSION: ICD-10-CM

## 2024-09-18 DIAGNOSIS — Z79.899 OTHER LONG TERM (CURRENT) DRUG THERAPY: ICD-10-CM

## 2024-09-18 DIAGNOSIS — E54 ASCORBIC ACID DEFICIENCY: ICD-10-CM

## 2024-09-18 DIAGNOSIS — Z79.01 LONG TERM (CURRENT) USE OF ANTICOAGULANTS: ICD-10-CM

## 2024-09-18 DIAGNOSIS — I48.0 PAROXYSMAL ATRIAL FIBRILLATION: ICD-10-CM

## 2024-09-18 DIAGNOSIS — I48.4 ATYPICAL ATRIAL FLUTTER: ICD-10-CM

## 2024-09-18 DIAGNOSIS — E78.5 HYPERLIPIDEMIA, UNSPECIFIED: ICD-10-CM

## 2024-10-15 ENCOUNTER — NON-APPOINTMENT (OUTPATIENT)
Age: 69
End: 2024-10-15

## 2024-10-16 ENCOUNTER — APPOINTMENT (OUTPATIENT)
Dept: ELECTROPHYSIOLOGY | Facility: CLINIC | Age: 69
End: 2024-10-16
Payer: MEDICARE

## 2024-10-16 VITALS
DIASTOLIC BLOOD PRESSURE: 80 MMHG | WEIGHT: 160 LBS | HEIGHT: 63 IN | BODY MASS INDEX: 28.35 KG/M2 | TEMPERATURE: 97.1 F | SYSTOLIC BLOOD PRESSURE: 138 MMHG | HEART RATE: 79 BPM

## 2024-10-16 DIAGNOSIS — I10 ESSENTIAL (PRIMARY) HYPERTENSION: ICD-10-CM

## 2024-10-16 PROBLEM — E54 ASCORBIC ACID DEFICIENCY: Chronic | Status: ACTIVE | Noted: 2024-08-28

## 2024-10-16 PROBLEM — D32.9 BENIGN NEOPLASM OF MENINGES, UNSPECIFIED: Chronic | Status: ACTIVE | Noted: 2024-08-28

## 2024-10-16 PROCEDURE — G2211 COMPLEX E/M VISIT ADD ON: CPT

## 2024-10-16 PROCEDURE — 93000 ELECTROCARDIOGRAM COMPLETE: CPT

## 2024-10-16 PROCEDURE — 99214 OFFICE O/P EST MOD 30 MIN: CPT

## 2024-10-24 ENCOUNTER — NON-APPOINTMENT (OUTPATIENT)
Age: 69
End: 2024-10-24

## 2024-10-24 ENCOUNTER — APPOINTMENT (OUTPATIENT)
Dept: CARDIOLOGY | Facility: CLINIC | Age: 69
End: 2024-10-24
Payer: MEDICARE

## 2024-10-24 VITALS
HEART RATE: 57 BPM | WEIGHT: 160 LBS | DIASTOLIC BLOOD PRESSURE: 78 MMHG | OXYGEN SATURATION: 99 % | HEIGHT: 63 IN | BODY MASS INDEX: 28.35 KG/M2 | SYSTOLIC BLOOD PRESSURE: 120 MMHG

## 2024-10-24 DIAGNOSIS — I48.92 UNSPECIFIED ATRIAL FLUTTER: ICD-10-CM

## 2024-10-24 DIAGNOSIS — Z98.890 OTHER SPECIFIED POSTPROCEDURAL STATES: ICD-10-CM

## 2024-10-24 DIAGNOSIS — E78.5 HYPERLIPIDEMIA, UNSPECIFIED: ICD-10-CM

## 2024-10-24 DIAGNOSIS — Z13.6 ENCOUNTER FOR SCREENING FOR CARDIOVASCULAR DISORDERS: ICD-10-CM

## 2024-10-24 DIAGNOSIS — Z78.9 OTHER SPECIFIED HEALTH STATUS: ICD-10-CM

## 2024-10-24 PROCEDURE — 99214 OFFICE O/P EST MOD 30 MIN: CPT

## 2024-10-24 PROCEDURE — G2211 COMPLEX E/M VISIT ADD ON: CPT

## 2024-10-24 PROCEDURE — 93000 ELECTROCARDIOGRAM COMPLETE: CPT

## 2024-11-18 NOTE — DISCHARGE NOTE PROVIDER - NSDCFUSCHEDAPPT_GEN_ALL_CORE_FT
Home
Venessa Bran  [facility] Physician [facility]  ELECTROPH [address]  Scheduled Appointment: 07/24/2024    Vivian Quesada  [facility] Physician [facility]  CARDIOLOGY [address]  Scheduled Appointment: 09/03/2024

## 2025-02-18 ENCOUNTER — RX RENEWAL (OUTPATIENT)
Age: 70
End: 2025-02-18

## 2025-03-05 ENCOUNTER — NON-APPOINTMENT (OUTPATIENT)
Age: 70
End: 2025-03-05

## 2025-03-05 ENCOUNTER — APPOINTMENT (OUTPATIENT)
Dept: ELECTROPHYSIOLOGY | Facility: CLINIC | Age: 70
End: 2025-03-05
Payer: MEDICARE

## 2025-03-05 VITALS
WEIGHT: 162 LBS | HEIGHT: 63 IN | SYSTOLIC BLOOD PRESSURE: 128 MMHG | BODY MASS INDEX: 28.7 KG/M2 | DIASTOLIC BLOOD PRESSURE: 86 MMHG | HEART RATE: 65 BPM

## 2025-03-05 DIAGNOSIS — I48.92 UNSPECIFIED ATRIAL FLUTTER: ICD-10-CM

## 2025-03-05 DIAGNOSIS — I10 ESSENTIAL (PRIMARY) HYPERTENSION: ICD-10-CM

## 2025-03-05 PROCEDURE — G2211 COMPLEX E/M VISIT ADD ON: CPT

## 2025-03-05 PROCEDURE — 93000 ELECTROCARDIOGRAM COMPLETE: CPT

## 2025-03-05 PROCEDURE — 99214 OFFICE O/P EST MOD 30 MIN: CPT

## 2025-03-05 RX ORDER — ALENDRONATE SODIUM 70 MG/1
70 TABLET ORAL WEEKLY
Refills: 0 | Status: ACTIVE | COMMUNITY

## 2025-04-09 ENCOUNTER — NON-APPOINTMENT (OUTPATIENT)
Age: 70
End: 2025-04-09

## 2025-04-10 ENCOUNTER — NON-APPOINTMENT (OUTPATIENT)
Age: 70
End: 2025-04-10

## 2025-04-10 ENCOUNTER — RESULT CHARGE (OUTPATIENT)
Age: 70
End: 2025-04-10

## 2025-04-10 ENCOUNTER — APPOINTMENT (OUTPATIENT)
Dept: CARDIOLOGY | Facility: CLINIC | Age: 70
End: 2025-04-10
Payer: MEDICARE

## 2025-04-10 VITALS
HEIGHT: 63 IN | BODY MASS INDEX: 28.7 KG/M2 | WEIGHT: 162 LBS | HEART RATE: 60 BPM | DIASTOLIC BLOOD PRESSURE: 70 MMHG | SYSTOLIC BLOOD PRESSURE: 115 MMHG

## 2025-04-10 DIAGNOSIS — I48.92 UNSPECIFIED ATRIAL FLUTTER: ICD-10-CM

## 2025-04-10 DIAGNOSIS — E78.5 HYPERLIPIDEMIA, UNSPECIFIED: ICD-10-CM

## 2025-04-10 DIAGNOSIS — Z13.6 ENCOUNTER FOR SCREENING FOR CARDIOVASCULAR DISORDERS: ICD-10-CM

## 2025-04-10 DIAGNOSIS — Z98.890 OTHER SPECIFIED POSTPROCEDURAL STATES: ICD-10-CM

## 2025-04-10 DIAGNOSIS — I10 ESSENTIAL (PRIMARY) HYPERTENSION: ICD-10-CM

## 2025-04-10 PROCEDURE — 99214 OFFICE O/P EST MOD 30 MIN: CPT | Mod: 25

## 2025-04-10 PROCEDURE — 93000 ELECTROCARDIOGRAM COMPLETE: CPT

## 2025-04-10 RX ORDER — VALSARTAN 160 MG/1
160 TABLET, COATED ORAL DAILY
Qty: 90 | Refills: 3 | Status: ACTIVE | COMMUNITY
Start: 2025-04-10 | End: 1900-01-01

## 2025-04-10 RX ORDER — ALENDRONATE SODIUM 70 MG/1
70 TABLET ORAL
Refills: 0 | Status: ACTIVE | COMMUNITY

## 2025-05-14 RX ORDER — OLMESARTAN MEDOXOMIL 40 MG/1
40 TABLET, FILM COATED ORAL DAILY
Qty: 30 | Refills: 0 | Status: ACTIVE | COMMUNITY
Start: 2025-05-14 | End: 1900-01-01

## 2025-07-07 ENCOUNTER — APPOINTMENT (OUTPATIENT)
Dept: ELECTROPHYSIOLOGY | Facility: CLINIC | Age: 70
End: 2025-07-07
Payer: MEDICARE

## 2025-07-07 VITALS
BODY MASS INDEX: 28.7 KG/M2 | HEART RATE: 59 BPM | SYSTOLIC BLOOD PRESSURE: 124 MMHG | HEIGHT: 63 IN | WEIGHT: 162 LBS | DIASTOLIC BLOOD PRESSURE: 80 MMHG

## 2025-07-07 PROCEDURE — 93000 ELECTROCARDIOGRAM COMPLETE: CPT

## 2025-07-07 PROCEDURE — 99214 OFFICE O/P EST MOD 30 MIN: CPT

## 2025-07-07 PROCEDURE — G2211 COMPLEX E/M VISIT ADD ON: CPT

## 2025-07-15 ENCOUNTER — TRANSCRIPTION ENCOUNTER (OUTPATIENT)
Age: 70
End: 2025-07-15

## 2025-07-15 ENCOUNTER — OUTPATIENT (OUTPATIENT)
Dept: OUTPATIENT SERVICES | Facility: HOSPITAL | Age: 70
LOS: 1 days | Discharge: HOME | End: 2025-07-15
Payer: MEDICARE

## 2025-07-15 VITALS
RESPIRATION RATE: 20 BRPM | DIASTOLIC BLOOD PRESSURE: 56 MMHG | TEMPERATURE: 97 F | HEART RATE: 60 BPM | SYSTOLIC BLOOD PRESSURE: 120 MMHG | OXYGEN SATURATION: 98 %

## 2025-07-15 VITALS
WEIGHT: 160.06 LBS | OXYGEN SATURATION: 98 % | HEIGHT: 63 IN | SYSTOLIC BLOOD PRESSURE: 149 MMHG | HEART RATE: 69 BPM | DIASTOLIC BLOOD PRESSURE: 92 MMHG | RESPIRATION RATE: 18 BRPM | TEMPERATURE: 97 F

## 2025-07-15 DIAGNOSIS — Z98.890 OTHER SPECIFIED POSTPROCEDURAL STATES: Chronic | ICD-10-CM

## 2025-07-15 DIAGNOSIS — I48.91 UNSPECIFIED ATRIAL FIBRILLATION: Chronic | ICD-10-CM

## 2025-07-15 DIAGNOSIS — Z80.0 FAMILY HISTORY OF MALIGNANT NEOPLASM OF DIGESTIVE ORGANS: ICD-10-CM

## 2025-07-15 DIAGNOSIS — Z86.0100 PERSONAL HISTORY OF COLON POLYPS, UNSPECIFIED: ICD-10-CM

## 2025-07-15 PROCEDURE — 88305 TISSUE EXAM BY PATHOLOGIST: CPT | Mod: 26

## 2025-07-15 PROCEDURE — 88305 TISSUE EXAM BY PATHOLOGIST: CPT

## 2025-07-15 RX ORDER — ALENDRONATE SODIUM 70 MG/1
1 TABLET ORAL
Refills: 0 | DISCHARGE

## 2025-07-15 RX ORDER — OLMESARTAN MEDOXOMIL 5 MG/1
40 TABLET, FILM COATED ORAL
Refills: 0 | DISCHARGE

## 2025-07-15 NOTE — H&P PST ADULT - ASSESSMENT
Patient with History of colon polyps. Family History of CRC For colonoscopy. Risks, Benefits, Alternatives explained.

## 2025-07-15 NOTE — CHART NOTE - NSCHARTNOTEFT_GEN_A_CORE
PACU ANESTHESIA ADMISSION NOTE      Procedure: Colonsoscopy  Post op diagnosis:      ____  Intubated  TV:______       Rate: ______      FiO2: ______    __x__  Patent Airway    __x__  Full return of protective reflexes    __x__  Full recovery from anesthesia / back to baseline     Vitals:   T:  97.8         R:  14                BP:     106/54             Sat:   97%                P: 63      Mental Status:  _x___ Awake   _x____ Alert   _____ Drowsy   _____ Sedated    Nausea/Vomiting:  __x__ NO  ______Yes,   See Post - Op Orders          Pain Scale (0-10):  __0___    Treatment: ____ None    __x__ See Post - Op/PCA Orders    Post - Operative Fluids:   ____ Oral   __x__ See Post - Op Orders    Plan: Discharge:   __x__Home       _____Floor     _____Critical Care    _____  Other:_________________    Comments: patient hemodynamically stable. Handoff endorsed to PACU RN. No anesthesia related issues present. To be discharged home when criteria met

## 2025-07-15 NOTE — ASU DISCHARGE PLAN (ADULT/PEDIATRIC) - FINANCIAL ASSISTANCE
[facility] provides services at a reduced cost to those who are determined to be eligible through [facility]’s financial assistance program. Information regarding [facility]’s financial assistance program can be found by going to [email or web address] or by calling [phone].

## 2025-07-15 NOTE — ASU DISCHARGE PLAN (ADULT/PEDIATRIC) - CARE PROVIDER_API CALL
Yousif Olson J  Gastroenterology  [address] [place] [address]  Phone: [phone]  Fax: [phone]  Established Patient  Follow Up Time:

## 2025-07-17 LAB — SURGICAL PATHOLOGY STUDY: SIGNIFICANT CHANGE UP

## 2025-07-19 DIAGNOSIS — K57.30 DIVERTICULOSIS OF LARGE INTESTINE WITHOUT PERFORATION OR ABSCESS WITHOUT BLEEDING: ICD-10-CM

## 2025-07-19 DIAGNOSIS — Z86.0100 PERSONAL HISTORY OF COLON POLYPS, UNSPECIFIED: ICD-10-CM

## 2025-07-19 DIAGNOSIS — I48.91 UNSPECIFIED ATRIAL FIBRILLATION: ICD-10-CM

## 2025-07-19 DIAGNOSIS — E78.5 HYPERLIPIDEMIA, UNSPECIFIED: ICD-10-CM

## 2025-07-19 DIAGNOSIS — K64.9 UNSPECIFIED HEMORRHOIDS: ICD-10-CM

## 2025-07-19 DIAGNOSIS — K63.5 POLYP OF COLON: ICD-10-CM

## 2025-07-19 DIAGNOSIS — Z80.0 FAMILY HISTORY OF MALIGNANT NEOPLASM OF DIGESTIVE ORGANS: ICD-10-CM

## 2025-07-19 DIAGNOSIS — I10 ESSENTIAL (PRIMARY) HYPERTENSION: ICD-10-CM

## 2025-07-19 DIAGNOSIS — Z79.01 LONG TERM (CURRENT) USE OF ANTICOAGULANTS: ICD-10-CM
